# Patient Record
Sex: FEMALE | Race: WHITE | NOT HISPANIC OR LATINO | Employment: FULL TIME | URBAN - METROPOLITAN AREA
[De-identification: names, ages, dates, MRNs, and addresses within clinical notes are randomized per-mention and may not be internally consistent; named-entity substitution may affect disease eponyms.]

---

## 2019-03-04 ENCOUNTER — CONSULT (OUTPATIENT)
Dept: ONCOLOGY | Facility: CLINIC | Age: 44
End: 2019-03-04

## 2019-03-04 VITALS
HEART RATE: 75 BPM | TEMPERATURE: 97.5 F | RESPIRATION RATE: 16 BRPM | OXYGEN SATURATION: 98 % | WEIGHT: 168 LBS | HEIGHT: 66 IN | DIASTOLIC BLOOD PRESSURE: 77 MMHG | SYSTOLIC BLOOD PRESSURE: 122 MMHG | BODY MASS INDEX: 27 KG/M2

## 2019-03-04 DIAGNOSIS — I82.402 DEEP VEIN THROMBOSIS (DVT) OF LEFT LOWER EXTREMITY, UNSPECIFIED CHRONICITY, UNSPECIFIED VEIN (HCC): Primary | ICD-10-CM

## 2019-03-04 PROBLEM — I82.409 DVT (DEEP VENOUS THROMBOSIS) (HCC): Status: ACTIVE | Noted: 2019-03-04

## 2019-03-04 PROCEDURE — 99204 OFFICE O/P NEW MOD 45 MIN: CPT | Performed by: INTERNAL MEDICINE

## 2019-03-04 RX ORDER — MULTIVIT-MIN/IRON/FOLIC ACID/K 18-600-40
2000 CAPSULE ORAL DAILY
COMMUNITY

## 2019-03-04 NOTE — PROGRESS NOTES
ID: 43 y.o. year old female from [facility] [id number]    PCP: Anthony Rede MD    REFERRING PHYSICIAN: No ref. provider found    Reason for Consultation: History of a left lower extremity DVT    Dear Dr. Reed    It is a pleasure to see Ms. Lira again.  It has been 3 years since I last saw her.  She presents today for consultation due to her history of left lower extremity DVT and the need to discontinue anticoagulation.  She initially presented with unprovoked DVT of her left lower extremity.  At that time she was morbidly obese and I felt that the reason for her blood clot was her morbid obesity.  Which can cause a hyper estrogen state.  Since then she is undergone a gastric sleeve which has resulted in 160 pound weight loss.  At the time due to her insurance refusing to pay for anticoagulation she had an IVC filter placed prior to the procedure.  Unfortunately it was unable to be removed postoperatively.  She continues to tolerate her Eliquis reasonably well.  She has no sequela of her DVT.    Past Medical History:   Diagnosis Date   • DVT (deep venous thrombosis) (CMS/HCC)    • Gallstones    • Kidney stones        Past Surgical History:   Procedure Laterality Date   •  SECTION  2005   • CHOLECYSTECTOMY  2003   • GASTRIC SLEEVE LAPAROSCOPIC  2017   • KNEE ARTHROSCOPY Left 2010   • OTHER SURGICAL HISTORY  2017    IVC Filter (unable to remove) Attempted removal 2018   • PAROTIDECTOMY Right 2017   • TONSILLECTOMY  2014       Social History     Socioeconomic History   • Marital status:      Spouse name: Not on file   • Number of children: Not on file   • Years of education: Not on file   • Highest education level: Not on file   Tobacco Use   • Smoking status: Never Smoker   • Smokeless tobacco: Never Used   Substance and Sexual Activity   • Alcohol use: No     Frequency: Never   • Drug use: No   • Sexual activity: Defer       Family History   Problem Relation  Age of Onset   • Breast cancer Mother    • Heart disease Father    • Kidney cancer Father    • Breast cancer Paternal Aunt    • Throat cancer Paternal Grandfather    • Prostate cancer Paternal Grandfather        Review of Systems:    16 point review of systems was performed and reviewed and scanned into the EMR    Review of Systems - Oncology      Current Outpatient Medications:   •  Calcium Carbonate-Vit D-Min (CALCIUM 1200 PO), Take 1 tablet by mouth 2 (Two) Times a Day., Disp: , Rfl:   •  Cholecalciferol (VITAMIN D) 2000 units capsule, Take 2,000 Units by mouth Daily., Disp: , Rfl:   •  Multiple Vitamins-Minerals (MULTIVITAL PO), Take 1 tablet by mouth Daily., Disp: , Rfl:   •  XARELTO 20 MG tablet, TAKE ONE TABLET BY MOUTH DAILY, Disp: 30 tablet, Rfl: 11    Allergies   Allergen Reactions   • Sulfa Antibiotics Rash       ECOG SCORE: 0    Objective     Vitals:    03/04/19 0923   BP: 122/77   Pulse: 75   Resp: 16   Temp: 97.5 °F (36.4 °C)   SpO2: 98%     Wt Readings from Last 3 Encounters:   03/04/19 76.2 kg (168 lb)       Physical Exam    General: well appearing, in no acute distress  HEENT: sclera anicteric, oropharynx clear, neck is supple  Lymphatics: no cervical, supraclavicular, or axillary adenopathy  Cardiovascular: regular rate and rhythm, no murmurs, rubs or gallops  Lungs: clear to auscultation bilaterally  Abdomen: soft, nontender, nondistended.  No palpable organomegaly  Extremities: no lower extremity edema  Skin: no rashes, lesions, bruising, or petechiae  Msk:  Shows no weakness of the large muscle groups  Psych: Mood is stable        ASSESSMENT:    1.  History of DVT secondary to morbid obesity.  I spoke to her regarding her risk of discontinuing anticoagulation.  I think she can do it with minimal risk.  She is lost considerable weight and she is no longer morbidly obese.  However having an IVC filter in place creates thrombogenic focus.  I do recommend an aspirin a day to prevent further clotting  issues.  Also have recommended that she wear compression stockings during the day.  She is willing to do this.  I think this will at least mitigate the risk of blood clots.  She can continue her current bottle of Eliquis till it is emptied and then can discontinue it.  I will see her on an as-needed basis.    I spent a total of 45 minutes in direct patient care, greater than 30 minutes (greater than 50%) were spent in coordination of care, and counseling the patient regarding discussion for the need to stop anticoagulation in the setting of a DVT. Answered any questions patient had with medication and plan.        Thank you for allowing me to participate in the care of this patient.    Yours sincerely,    Marcin Fabian MD  [facility]  Hematology and Oncology         No orders of the defined types were placed in this encounter.        Please note that portions of this note may have been completed with a voice recognition program. Efforts were made to edit the dictations, but occasionally words are mistranscribed.

## 2020-01-13 ENCOUNTER — OFFICE VISIT (OUTPATIENT)
Dept: ORTHOPEDIC SURGERY | Facility: CLINIC | Age: 45
End: 2020-01-13

## 2020-01-13 VITALS — HEART RATE: 79 BPM | HEIGHT: 66 IN | BODY MASS INDEX: 25.55 KG/M2 | WEIGHT: 158.95 LBS | OXYGEN SATURATION: 99 %

## 2020-01-13 DIAGNOSIS — M19.072 OSTEOARTHRITIS OF LEFT ANKLE OR FOOT: ICD-10-CM

## 2020-01-13 DIAGNOSIS — M21.622 TAILOR'S BUNION OF LEFT FOOT: ICD-10-CM

## 2020-01-13 DIAGNOSIS — M79.672 LEFT FOOT PAIN: Primary | ICD-10-CM

## 2020-01-13 PROCEDURE — 99204 OFFICE O/P NEW MOD 45 MIN: CPT | Performed by: ORTHOPAEDIC SURGERY

## 2020-01-13 NOTE — PROGRESS NOTES
NEW PATIENT    Patient: Emy Lira  : 1975    Primary Care Provider: Anthony Reed MD    Requesting Provider: As above    Pain of the Left Foot      History    Chief Complaint: Left foot pain    History of Present Illness: This is a very pleasant 44-year-old woman who is here today with her mother.  She has a complicated history with respect to her left foot.  She had pain in the tarsometatarsal joints as well as over the fifth metatarsal head for many years.  She has tried custom orthotics, she has tried topical medication she has tried different shoe sizes.  She had more pain on uneven ground in the midfoot, better with rest.  She has a history of DVT x2 and has an IVC filter and that could not be removed.  She was on Xarelto for quite a few years.  However she had a gastric sleeve and has lost 260 pounds.  With that, Dr. Fabian has allowed her to be off Xarelto.  He did not find any other cause than obesity for the DVTs.  Also now that she has lost that much weight, she has far less pain in the tarsometatarsal joints.  She still has severe pain over the fifth metatarsal head.  She rates it as 3-6 out of 10, sharp and aching and throbbing.  She is interested in surgery because she cannot wear any shoe except the ones she has on today which is a soft cloth athletic shoe.    Current Outpatient Medications on File Prior to Visit   Medication Sig Dispense Refill   • Calcium Carbonate-Vit D-Min (CALCIUM 1200 PO) Take 1 tablet by mouth 2 (Two) Times a Day.     • Cholecalciferol (VITAMIN D) 2000 units capsule Take 2,000 Units by mouth Daily.     • Multiple Vitamins-Minerals (MULTIVITAL PO) Take 1 tablet by mouth Daily.     • [DISCONTINUED] XARELTO 20 MG tablet TAKE ONE TABLET BY MOUTH DAILY 30 tablet 11     No current facility-administered medications on file prior to visit.       Allergies   Allergen Reactions   • Sulfa Antibiotics Rash      Past Medical History:   Diagnosis Date   • DVT (deep  "venous thrombosis) (CMS/HCC)    • Gallstones    • Kidney stones      Past Surgical History:   Procedure Laterality Date   •  SECTION  2005   • CHOLECYSTECTOMY  2003   • GASTRIC SLEEVE LAPAROSCOPIC  2017   • KNEE ARTHROSCOPY Left 2010   • OTHER SURGICAL HISTORY  2017    IVC Filter (unable to remove) Attempted removal 2018   • PAROTIDECTOMY Right 2017   • TONSILLECTOMY  2014     Family History   Problem Relation Age of Onset   • Breast cancer Mother    • Heart disease Father    • Kidney cancer Father    • Breast cancer Paternal Aunt    • Throat cancer Paternal Grandfather    • Prostate cancer Paternal Grandfather       Social History     Socioeconomic History   • Marital status:      Spouse name: Not on file   • Number of children: Not on file   • Years of education: Not on file   • Highest education level: Not on file   Tobacco Use   • Smoking status: Never Smoker   • Smokeless tobacco: Never Used   Substance and Sexual Activity   • Alcohol use: No     Frequency: Never   • Drug use: No   • Sexual activity: Defer        Review of Systems   Constitutional: Negative.    HENT: Negative.    Eyes: Negative.    Respiratory: Negative.    Cardiovascular: Negative.    Gastrointestinal: Negative.    Endocrine: Negative.    Genitourinary: Negative.    Musculoskeletal: Positive for arthralgias and joint swelling.   Skin: Negative.    Allergic/Immunologic: Negative.    Neurological: Negative.    Hematological: Negative.    Psychiatric/Behavioral: Negative.        The following portions of the patient's history were reviewed and updated as appropriate: allergies, current medications, past family history, past medical history, past social history, past surgical history and problem list.    Physical Exam:   Pulse 79   Ht 166.4 cm (65.51\")   Wt 72.1 kg (158 lb 15.2 oz)   SpO2 99%   Breastfeeding No   BMI 26.04 kg/m²   GENERAL: Body habitus: Mildly overweight    Lower extremity " edema: Right: none; Left: none    Varicose veins:  Right: none; Left: none    Gait: normal     Mental Status:  awake and alert; oriented to person, place, and time    Voice:  clear  SKIN:  Lower extremity: Normal and warm and dry    Hair Growth(lower extremity):  Right:normal; Left:  normal  NAILS: Toenails: normal  HEENT: Head: Normocephalic, atraumatic,  without obvious abnormality.  eye: normal external eye, no icterus  ears:normal external ears  nose: normal external nose  pharynx: dental hygiene adequate  PULM:  Repiratory effort normal  CV:  Dorsalis Pedis:  Right: 2+; Left:2+    Posterior Tibial: Right:2+; Left:2+    Capillary Refill:  Brisk  MSK:  Hand:right handed      Tibia:  Right:  non tender; Left:  non tender      Ankle:  Right: non tender, ROM  normal and symmetric and motor function  normal; Left:  non tender, ROM  normal and symmetric and motor function  normal      Foot:  Right:  Milder metatarsus adductus, small dorsal osteophytes over the TMT's, nontender; Left:  A little more prominent metatarsus adductus, moderate osteophytes over the TMT's but they are nontender.  She is very tender over the lateral aspect of the fifth metatarsal head.      NEURO: Heel Walking:  Right:  normal; Left:  normal    Toe Walking:  Right:  normal; Left:  Painful fifth metatarsal head laterally     [place]-Tomy 5.07 monofilament test: normal    Lower extremity sensation: intact     Reflexes:  Biceps:  Right:  2+; Left:  2+           Quads:  Right:  2+; Left:  2+           Ankle:  Right:  1+; Left:  1+      Calf Atrophy:none    Motor Function: all 5/5         Medical Decision Making    Data Review:   ordered and reviewed x-rays today and reviewed outside records    Assessment and Plan/ Diagnosis/Treatment options:   1. Tailor's bunion of left foot  Painful bunionette or tailor's bunion that has been present for quite a few years.  She has tried extensive conservative treatment.  She would like to consider surgery.   I explained I would do a chevron osteotomy. I explained this in detail.  There will be a pin in the toe for about 4 weeks, I explained how I remove it in the office.  They will walk in  a post op shoe for about 6 weeks, then wide sandal 6 weeks.  I explained how all foot and ankle surgery swells longer than any other surgery- it can swell for many months.  I compared it to facial surgery.  I explained there will be some stiffness in the 5th toe, it is generally not clinically significant. We will do this as an outpatient.     I explained the risks including but not limited to death, infection, neurovascular damage, strokes, heart attacks, blood clots, malunion, nonunion, hardware failure, stiffness, chronic pain,  deformity, toe deformity, amputation, etc.  Questions were asked and answered in detail.  I also spoke with Dr. Fabian, we can use aspirin postoperatively as a DVT prophylaxis since she will be allowed to weight-bear.  - Basic Metabolic Panel; Future  - CBC & Differential; Future  - ECG 12 Lead; Future  - Hemoglobin A1c; Future    2. Osteoarthritis of left ankle or foot  Interestingly, with her weight loss she has much less midfoot pain.  This is consistent with the paper presented at our summer orthopedic foot and ankle meeting showing exactly that.  Midfoot arthritis pain was significantly lessened with weight loss.        Radiology Ordered []  Radiology Reports Reviewed []      Radiology Images Reviewed []   Labs Reviewed []    Labs Ordered []   PCP Records Reviewed []    Provider Records Reviewed []    ER Records Reviewed []    Hospital Records Reviewed []    History Obtained From Family []    Phone conversation with Provider []    Records Requested []            Answers for HPI/ROS submitted by the patient on 1/11/2020   How long have you been having these symptoms?: Other

## 2020-03-17 ENCOUNTER — TELEPHONE (OUTPATIENT)
Dept: ORTHOPEDIC SURGERY | Facility: CLINIC | Age: 45
End: 2020-03-17

## 2020-03-17 NOTE — TELEPHONE ENCOUNTER
CALLED PATIENT TO ADVISE THAT SURGERY ON 4/9/20 WITH DR. FLOYD HAS BEEN CXL'D, NO ANSWER, LVM TO RETURN MY CALL.

## 2020-03-26 ENCOUNTER — APPOINTMENT (OUTPATIENT)
Dept: PREADMISSION TESTING | Facility: HOSPITAL | Age: 45
End: 2020-03-26

## 2020-07-20 ENCOUNTER — APPOINTMENT (OUTPATIENT)
Dept: PREADMISSION TESTING | Facility: HOSPITAL | Age: 45
End: 2020-07-20

## 2020-08-17 ENCOUNTER — APPOINTMENT (OUTPATIENT)
Dept: PREADMISSION TESTING | Facility: HOSPITAL | Age: 45
End: 2020-08-17

## 2020-10-26 ENCOUNTER — APPOINTMENT (OUTPATIENT)
Dept: PREADMISSION TESTING | Facility: HOSPITAL | Age: 45
End: 2020-10-26

## 2020-10-26 LAB
ANION GAP SERPL CALCULATED.3IONS-SCNC: 9 MMOL/L (ref 5–15)
BUN SERPL-MCNC: 18 MG/DL (ref 6–20)
BUN/CREAT SERPL: 30.5 (ref 7–25)
CALCIUM SPEC-SCNC: 9.8 MG/DL (ref 8.6–10.5)
CHLORIDE SERPL-SCNC: 106 MMOL/L (ref 98–107)
CO2 SERPL-SCNC: 26 MMOL/L (ref 22–29)
CREAT SERPL-MCNC: 0.59 MG/DL (ref 0.57–1)
DEPRECATED RDW RBC AUTO: 45.1 FL (ref 37–54)
ERYTHROCYTE [DISTWIDTH] IN BLOOD BY AUTOMATED COUNT: 12.7 % (ref 12.3–15.4)
GFR SERPL CREATININE-BSD FRML MDRD: 111 ML/MIN/1.73
GLUCOSE SERPL-MCNC: 82 MG/DL (ref 65–99)
HBA1C MFR BLD: 4.7 % (ref 4.8–5.6)
HCT VFR BLD AUTO: 41.2 % (ref 34–46.6)
HGB BLD-MCNC: 13.5 G/DL (ref 12–15.9)
MCH RBC QN AUTO: 32.5 PG (ref 26.6–33)
MCHC RBC AUTO-ENTMCNC: 32.8 G/DL (ref 31.5–35.7)
MCV RBC AUTO: 99 FL (ref 79–97)
PLATELET # BLD AUTO: 272 10*3/MM3 (ref 140–450)
PMV BLD AUTO: 9.8 FL (ref 6–12)
POTASSIUM SERPL-SCNC: 4.5 MMOL/L (ref 3.5–5.2)
RBC # BLD AUTO: 4.16 10*6/MM3 (ref 3.77–5.28)
SODIUM SERPL-SCNC: 141 MMOL/L (ref 136–145)
WBC # BLD AUTO: 6.43 10*3/MM3 (ref 3.4–10.8)

## 2020-10-26 PROCEDURE — 80048 BASIC METABOLIC PNL TOTAL CA: CPT | Performed by: ORTHOPAEDIC SURGERY

## 2020-10-26 PROCEDURE — 36415 COLL VENOUS BLD VENIPUNCTURE: CPT

## 2020-10-26 PROCEDURE — 93005 ELECTROCARDIOGRAM TRACING: CPT

## 2020-10-26 PROCEDURE — C9803 HOPD COVID-19 SPEC COLLECT: HCPCS

## 2020-10-26 PROCEDURE — U0004 COV-19 TEST NON-CDC HGH THRU: HCPCS

## 2020-10-26 PROCEDURE — 93010 ELECTROCARDIOGRAM REPORT: CPT | Performed by: INTERNAL MEDICINE

## 2020-10-26 PROCEDURE — 85027 COMPLETE CBC AUTOMATED: CPT | Performed by: ORTHOPAEDIC SURGERY

## 2020-10-26 PROCEDURE — 83036 HEMOGLOBIN GLYCOSYLATED A1C: CPT | Performed by: ORTHOPAEDIC SURGERY

## 2020-10-27 LAB — SARS-COV-2 RNA RESP QL NAA+PROBE: NOT DETECTED

## 2020-10-28 ENCOUNTER — TELEPHONE (OUTPATIENT)
Dept: ORTHOPEDIC SURGERY | Facility: CLINIC | Age: 45
End: 2020-10-28

## 2020-10-28 DIAGNOSIS — M21.622 TAILOR'S BUNION OF LEFT FOOT: Primary | ICD-10-CM

## 2020-10-28 RX ORDER — HYDROCODONE BITARTRATE AND ACETAMINOPHEN 7.5; 325 MG/1; MG/1
1-2 TABLET ORAL EVERY 6 HOURS PRN
Qty: 45 TABLET | Refills: 0 | Status: SHIPPED | OUTPATIENT
Start: 2020-10-28 | End: 2020-11-11

## 2020-10-28 RX ORDER — ONDANSETRON 4 MG/1
4 TABLET, FILM COATED ORAL EVERY 6 HOURS PRN
Qty: 30 TABLET | Refills: 0 | Status: SHIPPED | OUTPATIENT
Start: 2020-10-28 | End: 2020-11-11

## 2020-10-28 RX ORDER — OXYCODONE HYDROCHLORIDE AND ACETAMINOPHEN 5; 325 MG/1; MG/1
1-2 TABLET ORAL EVERY 6 HOURS PRN
Qty: 45 TABLET | Refills: 0 | Status: SHIPPED | OUTPATIENT
Start: 2020-10-28 | End: 2020-11-11

## 2020-10-28 NOTE — TELEPHONE ENCOUNTER
CALLED PATIENT TO ADVISE OF 12:15 ARRIVAL TIME FOR SURGERY ON 1/29/20 WITH DR. FLOYD, NO ANSWER.  LVM WITH DETAILS AND REQUEST TO RETURN MY CALL CONFIRMING RECEIPT OF MESSAGE.

## 2020-10-29 ENCOUNTER — OUTSIDE FACILITY SERVICE (OUTPATIENT)
Dept: ORTHOPEDIC SURGERY | Facility: CLINIC | Age: 45
End: 2020-10-29

## 2020-10-29 PROCEDURE — 28308 INCISION OF METATARSAL: CPT | Performed by: PHYSICIAN ASSISTANT

## 2020-10-29 PROCEDURE — 28308 INCISION OF METATARSAL: CPT | Performed by: ORTHOPAEDIC SURGERY

## 2020-11-06 ENCOUNTER — OFFICE VISIT (OUTPATIENT)
Dept: ORTHOPEDIC SURGERY | Facility: CLINIC | Age: 45
End: 2020-11-06

## 2020-11-06 DIAGNOSIS — Z09 SURGERY FOLLOW-UP: Primary | ICD-10-CM

## 2020-11-06 PROCEDURE — 99024 POSTOP FOLLOW-UP VISIT: CPT | Performed by: ORTHOPAEDIC SURGERY

## 2020-11-06 NOTE — PROGRESS NOTES
Post-op (1 week status post Left fitth chevron osteotomy 10/29/20)      Emy Lira is 1 week status post fifth metatarsal chevron left, 10/29/2020. She reports no fever, chills.  She reports pain is well controlled.  They have been taking aspirin for DVT prophylaxis.  They have been weight bearing on heel in post op shoe.      Past Surgical History:   Procedure Laterality Date   •  SECTION  2005   • CHOLECYSTECTOMY  2003   • GASTRIC SLEEVE LAPAROSCOPIC  2017   • KNEE ARTHROSCOPY Left 2010   • OTHER SURGICAL HISTORY  2017    IVC Filter (unable to remove) Attempted removal 2018   • PAROTIDECTOMY Right 2017   • TONSILLECTOMY  2014       There were no vitals taken for this visit.        Good alignment, no erythema, no drainage, no sign of infection, normal post op swelling pin sites clean left foot    ordered and reviewed x-rays today    Assessment and Plan:   1. Surgery follow-up  Doing well I replaced her into a dressing.  Continue weightbearing on her heel.  I will see her in a week to probably remove sutures, no x-ray needed at that time unless she is having a problem  - XR Foot 2 View Left          Lucie Lord MD

## 2020-11-11 ENCOUNTER — OFFICE VISIT (OUTPATIENT)
Dept: ORTHOPEDIC SURGERY | Facility: CLINIC | Age: 45
End: 2020-11-11

## 2020-11-11 DIAGNOSIS — M21.622 TAILOR'S BUNION OF LEFT FOOT: Primary | ICD-10-CM

## 2020-11-11 PROCEDURE — 99024 POSTOP FOLLOW-UP VISIT: CPT | Performed by: ORTHOPAEDIC SURGERY

## 2020-11-11 NOTE — PROGRESS NOTES
Post-op (1 week follow up; 2 week status post Left fifth chevron osteotomy 10/29/20)      Emy Lira is 2 weeks status post left chevron osteotomy, 10/29/2020. She reports no fever, chills.  She reports pain is well controlled.  They have been taking aspirin for DVT prophylaxis.  They have been weight bearing on heel in post op shoe.      Past Surgical History:   Procedure Laterality Date   •  SECTION  2005   • CHOLECYSTECTOMY  2003   • GASTRIC SLEEVE LAPAROSCOPIC  2017   • KNEE ARTHROSCOPY Left 2010   • OTHER SURGICAL HISTORY  2017    IVC Filter (unable to remove) Attempted removal 2018   • PAROTIDECTOMY Right 2017   • TONSILLECTOMY  2014       There were no vitals taken for this visit.        Good alignment, no erythema, no drainage, no sign of infection, normal post op swelling left foot, pin site clean    none    Assessment and Plan:   1. Tailor's bunion of left foot  Doing well we removed sutures, pin site is clean.  I will see her again in a week for an x-ray to probably remove the pin          Lucie Lord MD

## 2020-11-23 ENCOUNTER — OFFICE VISIT (OUTPATIENT)
Dept: ORTHOPEDIC SURGERY | Facility: CLINIC | Age: 45
End: 2020-11-23

## 2020-11-23 DIAGNOSIS — Z09 SURGERY FOLLOW-UP: Primary | ICD-10-CM

## 2020-11-23 PROCEDURE — 99024 POSTOP FOLLOW-UP VISIT: CPT | Performed by: ORTHOPAEDIC SURGERY

## 2020-11-23 NOTE — PROGRESS NOTES
Post-op (1.5 week f/u--3 weeks status post Left fifth chevron osteotomy 10/29/20)  Answers for HPI/ROS submitted by the patient on 2020   What is the primary reason for your visit?: Other  Please describe your symptoms.: Post Op visit #3  Have you had these symptoms before?: No  How long have you been having these symptoms?: Greater than 2 weeks  Please list any medications you are currently taking for this condition.: None      Emy Lira is 3 weeks status post left 5th metatarsal chevron, 10/29/2020. She reports no fever, chills.  She reports pain is well controlled.  They have been taking aspirin for DVT prophylaxis.  They have been weight bearing on heel in post op shoe.      Past Surgical History:   Procedure Laterality Date   •  SECTION  2005   • CHOLECYSTECTOMY  2003   • GASTRIC SLEEVE LAPAROSCOPIC  2017   • KNEE ARTHROSCOPY Left 2010   • OTHER SURGICAL HISTORY  2017    IVC Filter (unable to remove) Attempted removal 2018   • PAROTIDECTOMY Right 2017   • TONSILLECTOMY  2014       There were no vitals taken for this visit.        Good alignment, no erythema, no drainage, no sign of infection, normal post op swelling pin site clean left foot    ordered and reviewed x-rays today    Assessment and Plan:   1. Surgery follow-up  I removed the pin, she may shower but no soaking until all scabs are gone, walk in post op shoe.  See me for xray in 6 weeks  - XR Foot 2 View Left          Lucie Lord MD

## 2021-01-04 ENCOUNTER — OFFICE VISIT (OUTPATIENT)
Dept: ORTHOPEDIC SURGERY | Facility: CLINIC | Age: 46
End: 2021-01-04

## 2021-01-04 DIAGNOSIS — Z09 SURGERY FOLLOW-UP: Primary | ICD-10-CM

## 2021-01-04 PROCEDURE — 99024 POSTOP FOLLOW-UP VISIT: CPT | Performed by: ORTHOPAEDIC SURGERY

## 2021-01-04 NOTE — PROGRESS NOTES
Post-op Follow-up (6 week follow up - 9 weeks status post Left fifth chevron osteotomy 10/29/20)      Emy Lira is 9 weeks status post left 5th chevron, 10/29/2020. She reports no fever, chills.  She reports pain is well controlled.  They have been taking off meds now for DVT prophylaxis.  They have been weight bearing as tolerated.      Past Surgical History:   Procedure Laterality Date   •  SECTION  2005   • CHOLECYSTECTOMY  2003   • GASTRIC SLEEVE LAPAROSCOPIC  2017   • KNEE ARTHROSCOPY Left 2010   • OTHER SURGICAL HISTORY  2017    IVC Filter (unable to remove) Attempted removal 2018   • PAROTIDECTOMY Right 2017   • TONSILLECTOMY  2014       There were no vitals taken for this visit.        Good alignment, no erythema, no drainage, no sign of infection, normal post op swelling left foot    ordered and reviewed x-rays today    Assessment and Plan:   1. Surgery follow-up  Doing well, she may slowly increase activity, no impact, wear any shoe that is comfortable.  I reminded her the swelling is normal.  I will see her in 3 months for final xray  - XR Foot 2 View Left          Lucie Rice MD

## 2021-04-05 ENCOUNTER — OFFICE VISIT (OUTPATIENT)
Dept: ORTHOPEDIC SURGERY | Facility: CLINIC | Age: 46
End: 2021-04-05

## 2021-04-05 VITALS
SYSTOLIC BLOOD PRESSURE: 123 MMHG | BODY MASS INDEX: 28.32 KG/M2 | WEIGHT: 176.2 LBS | DIASTOLIC BLOOD PRESSURE: 91 MMHG | HEART RATE: 82 BPM | HEIGHT: 66 IN

## 2021-04-05 DIAGNOSIS — Z09 SURGERY FOLLOW-UP: Primary | ICD-10-CM

## 2021-04-05 PROCEDURE — 99212 OFFICE O/P EST SF 10 MIN: CPT | Performed by: ORTHOPAEDIC SURGERY

## 2021-04-05 RX ORDER — UBROGEPANT 100 MG/1
TABLET ORAL
COMMUNITY
Start: 2021-02-09 | End: 2022-04-06 | Stop reason: SDUPTHER

## 2021-04-05 NOTE — PROGRESS NOTES
ESTABLISHED PATIENT    Patient: Emy Lira  : 1975    Primary Care Provider: Anthony Reed MD    Requesting Provider: As above    Follow-up (3 month follow up; 5.5 months status post Left fifth chevron osteotomy 10/29/20)      History    Chief Complaint: Status post left foot surgery    History of Present Illness: She is here for follow-up following left fifth metatarsal chevron osteotomy 10/29/2020.  She reports she is very happy with the pain relief.      Current Outpatient Medications on File Prior to Visit   Medication Sig Dispense Refill   • Calcium Carbonate-Vit D-Min (CALCIUM 1200 PO) Take 1 tablet by mouth 2 (Two) Times a Day.     • Cholecalciferol (VITAMIN D) 2000 units capsule Take 2,000 Units by mouth Daily.     • Multiple Vitamins-Minerals (MULTIVITAL PO) Take 1 tablet by mouth Daily.     • ubrogepant 100 MG tablet        No current facility-administered medications on file prior to visit.      Allergies   Allergen Reactions   • Sulfa Antibiotics Rash      Past Medical History:   Diagnosis Date   • DVT (deep venous thrombosis) (CMS/[facility])    • Gallstones    • Kidney stones      Past Surgical History:   Procedure Laterality Date   •  SECTION  2005   • CHOLECYSTECTOMY  2003   • GASTRIC SLEEVE LAPAROSCOPIC  2017   • KNEE ARTHROSCOPY Left 2010   • OTHER SURGICAL HISTORY  2017    IVC Filter (unable to remove) Attempted removal 2018   • PAROTIDECTOMY Right 2017   • TONSILLECTOMY  2014     Family History   Problem Relation Age of Onset   • Breast cancer Mother    • Heart disease Father    • Kidney cancer Father    • Breast cancer Paternal Aunt    • Throat cancer Paternal Grandfather    • Prostate cancer Paternal Grandfather       Social History     Socioeconomic History   • Marital status:      Spouse name: Not on file   • Number of children: Not on file   • Years of education: Not on file   • Highest education level: Not on file   Tobacco Use   •  "Smoking status: Never Smoker   • Smokeless tobacco: Never Used   Substance and Sexual Activity   • Alcohol use: No   • Drug use: No   • Sexual activity: Defer        Review of Systems   Constitutional: Negative.    HENT: Negative.    Eyes: Negative.    Respiratory: Negative.    Cardiovascular: Negative.    Gastrointestinal: Negative.    Endocrine: Negative.    Genitourinary: Negative.    Musculoskeletal: Positive for arthralgias.   Skin: Negative.    Allergic/Immunologic: Negative.    Neurological: Negative.    Hematological: Negative.    Psychiatric/Behavioral: Negative.        The following portions of the patient's history were reviewed and updated as appropriate: allergies, current medications, past family history, past medical history, past social history, past surgical history and problem list.    Physical Exam:   /91   Pulse 82   Ht 166.4 cm (65.51\")   Wt 79.9 kg (176 lb 3.2 oz)   BMI 28.86 kg/m²   GENERAL: Body habitus: overweight    Lower extremity edema: Left: none; Right: none    Gait: normal     Mental Status:  awake and alert; oriented to person, place, and time  MSK:  Left foot lateral fifth metatarsal incision is healed, no swelling, very slightly tender to palpation, no change in midfoot arthritis    Medical Decision Making    Data Review:   ordered and reviewed x-rays today    Assessment/Plan/Diagnosis/Treatment Options:   1. Surgery follow-up  She is radiographically and clinically healed.  She is happy with the pain relief.  The slight tenderness in the incision should slowly resolve over time.  I will be happy to see her anytime.  - XR Foot 2 View Left        Lucie Rice MD                      "

## 2022-04-06 ENCOUNTER — OFFICE VISIT (OUTPATIENT)
Dept: FAMILY MEDICINE CLINIC | Facility: CLINIC | Age: 47
End: 2022-04-06

## 2022-04-06 VITALS
WEIGHT: 195 LBS | HEIGHT: 67 IN | SYSTOLIC BLOOD PRESSURE: 138 MMHG | OXYGEN SATURATION: 98 % | BODY MASS INDEX: 30.61 KG/M2 | HEART RATE: 81 BPM | DIASTOLIC BLOOD PRESSURE: 80 MMHG

## 2022-04-06 DIAGNOSIS — Z00.00 ROUTINE GENERAL MEDICAL EXAMINATION AT A HEALTH CARE FACILITY: ICD-10-CM

## 2022-04-06 DIAGNOSIS — G43.009 MIGRAINE WITHOUT AURA AND WITHOUT STATUS MIGRAINOSUS, NOT INTRACTABLE: Primary | ICD-10-CM

## 2022-04-06 DIAGNOSIS — M25.551 RIGHT HIP PAIN: ICD-10-CM

## 2022-04-06 PROCEDURE — 99214 OFFICE O/P EST MOD 30 MIN: CPT | Performed by: FAMILY MEDICINE

## 2022-04-06 RX ORDER — ATOGEPANT 60 MG/1
60 TABLET ORAL DAILY
Qty: 30 TABLET | Refills: 11 | Status: SHIPPED | OUTPATIENT
Start: 2022-04-06

## 2022-04-06 RX ORDER — UBROGEPANT 100 MG/1
100 TABLET ORAL ONCE AS NEEDED
Qty: 10 TABLET | Refills: 11 | Status: SHIPPED | OUTPATIENT
Start: 2022-04-06

## 2022-04-06 RX ORDER — PREDNISONE 20 MG/1
TABLET ORAL
Qty: 18 TABLET | Refills: 0 | Status: SHIPPED | OUTPATIENT
Start: 2022-04-06

## 2022-04-06 RX ORDER — MEDROXYPROGESTERONE ACETATE 10 MG/1
TABLET ORAL
COMMUNITY
Start: 2021-09-01

## 2022-04-06 NOTE — PROGRESS NOTES
Follow Up Office Visit      Date of Visit:  2022   Patient Name: Emy Lira  : 1975   MRN: [id number]     Chief Complaint:    Chief Complaint   Patient presents with   • Headache     Needs refill of Ubrelvy       History of Present Illness: Emy Lira is a 47 y.o. female who is here today for follow up.    She has tried medications for abortive therapy as well as preventative therapy.  The Ubrelvy is working better than the triptans.  She is interested in trying something else for prevention.  She is also due for checkup blood work today.    Headache  Headache pattern:  Headache sometimes there, sometimes not at all  Recent changes:  No recent change in headache pattern  Frequency:  1 to 3 per month  Providers seen:  Primary care provider  Aggravating factors:  None  Abortive medications tried:  Sumatriptan  Preventative medications tried:  Topiramate        Subjective      Review of Systems:   Review of Systems   Constitutional: Negative for fatigue and fever.   HENT: Negative for congestion and ear pain.    Respiratory: Negative for apnea, cough, chest tightness and shortness of breath.    Cardiovascular: Negative for chest pain.   Gastrointestinal: Negative for abdominal pain, constipation, diarrhea and nausea.   Musculoskeletal: Negative for arthralgias.   Neurological: Positive for headache.   Psychiatric/Behavioral: Negative for depressed mood and stress.       Past Medical History:   Past Medical History:   Diagnosis Date   • DVT (deep venous thrombosis) (HCC)    • Gallstones    • Kidney stones        Past Surgical History:   Past Surgical History:   Procedure Laterality Date   •  SECTION  2005   • CHOLECYSTECTOMY  2003   • GASTRIC SLEEVE LAPAROSCOPIC  2017   • KNEE ARTHROSCOPY Left 2010   • OTHER SURGICAL HISTORY  2017    IVC Filter (unable to remove) Attempted removal 2018   • PAROTIDECTOMY Right 2017   • TONSILLECTOMY  2014       Family  "History:   Family History   Problem Relation Age of Onset   • Breast cancer Mother    • Heart disease Father    • Kidney cancer Father    • Breast cancer Paternal Aunt    • Throat cancer Paternal Grandfather    • Prostate cancer Paternal Grandfather        Social History:   Social History     Socioeconomic History   • Marital status:    Tobacco Use   • Smoking status: Never Smoker   • Smokeless tobacco: Never Used   Vaping Use   • Vaping Use: Never used   Substance and Sexual Activity   • Alcohol use: No   • Drug use: No   • Sexual activity: Defer       Medications:     Current Outpatient Medications:   •  Calcium Carbonate-Vit D-Min (CALCIUM 1200 PO), Take 1 tablet by mouth 2 (Two) Times a Day., Disp: , Rfl:   •  Cholecalciferol (VITAMIN D) 2000 units capsule, Take 2,000 Units by mouth Daily., Disp: , Rfl:   •  medroxyPROGESTERone (PROVERA) 10 MG tablet, , Disp: , Rfl:   •  Multiple Vitamins-Minerals (MULTIVITAL PO), Take 1 tablet by mouth Daily., Disp: , Rfl:   •  ubrogepant 100 MG tablet, Take 1 tablet by mouth 1 (One) Time As Needed (headache) for up to 1 dose., Disp: 10 tablet, Rfl: 11  •  Atogepant (Qulipta) 60 MG tablet, Take 1 tablet by mouth Daily., Disp: 30 tablet, Rfl: 11  •  predniSONE (DELTASONE) 20 MG tablet, 3 po qd x3 d then 2 po qd x3d then 1 po qd x3d, Disp: 18 tablet, Rfl: 0    Allergies:   Allergies   Allergen Reactions   • Sulfa Antibiotics Rash       Objective     Physical Exam:  Vital Signs:   Vitals:    04/06/22 0855   BP: 138/80   Pulse: 81   SpO2: 98%   Weight: 88.5 kg (195 lb)   Height: 170.2 cm (67\")     Body mass index is 30.54 kg/m².     Physical Exam  Vitals and nursing note reviewed.   Constitutional:       General: She is not in acute distress.     Appearance: Normal appearance. She is not ill-appearing.   HENT:      Head: Normocephalic and atraumatic.      Right Ear: Tympanic membrane and ear canal normal.      Left Ear: Tympanic membrane and ear canal normal.      Nose: Nose " normal.   Cardiovascular:      Rate and Rhythm: Normal rate and regular rhythm.      Heart sounds: Normal heart sounds.   Pulmonary:      Effort: Pulmonary effort is normal.      Breath sounds: Normal breath sounds.   Neurological:      Mental Status: She is alert and oriented to person, place, and time. Mental status is at baseline.   Psychiatric:         Mood and Affect: Mood normal.         Procedures    BMI is above normal parameters. Recommendations: exercise counseling/recommendations and nutrition counseling/recommendations        Assessment / Plan      Assessment/Plan:   Diagnoses and all orders for this visit:    1. Migraine without aura and without status migrainosus, not intractable (Primary)    2. Routine general medical examination at a health care facility  -     CBC Auto Differential; Future  -     Comprehensive Metabolic Panel; Future  -     Lipid Panel; Future  -     TSH; Future    3. Right hip pain    Other orders  -     ubrogepant 100 MG tablet; Take 1 tablet by mouth 1 (One) Time As Needed (headache) for up to 1 dose.  Dispense: 10 tablet; Refill: 11  -     Atogepant (Qulipta) 60 MG tablet; Take 1 tablet by mouth Daily.  Dispense: 30 tablet; Refill: 11  -     predniSONE (DELTASONE) 20 MG tablet; 3 po qd x3 d then 2 po qd x3d then 1 po qd x3d  Dispense: 18 tablet; Refill: 0         Refilled Ubrelvy.  Gave samples and prescription for preventative.  Use Qulipta.  She was also having some right hip pain.  Give a course of steroids.  Blood work for preventative exam.    Follow Up:   Return in about 1 year (around 4/6/2023) for Annual physical.    Anthony [facility] [place]   Answers for HPI/ROS submitted by the patient on 4/3/2022  Please describe your symptoms.: Medicine refill for migraines.  Have you had these symptoms before?: Yes  How long have you been having these symptoms?: Greater than 2 weeks  Please list any medications you are currently taking for this condition.:  Ubrevshane (spelling)  Please describe any probable cause for these symptoms. : Not sure  What is the primary reason for your visit?: Other

## 2022-04-07 ENCOUNTER — LAB (OUTPATIENT)
Dept: FAMILY MEDICINE CLINIC | Facility: CLINIC | Age: 47
End: 2022-04-07

## 2022-04-07 DIAGNOSIS — Z00.00 ROUTINE GENERAL MEDICAL EXAMINATION AT A HEALTH CARE FACILITY: ICD-10-CM

## 2022-04-07 PROCEDURE — 36415 COLL VENOUS BLD VENIPUNCTURE: CPT | Performed by: FAMILY MEDICINE

## 2022-04-08 LAB
ALBUMIN SERPL-MCNC: 3.8 G/DL (ref 3.8–4.8)
ALBUMIN/GLOB SERPL: 1.7 {RATIO} (ref 1.2–2.2)
ALP SERPL-CCNC: 112 IU/L (ref 44–121)
ALT SERPL-CCNC: 14 IU/L (ref 0–32)
AST SERPL-CCNC: 16 IU/L (ref 0–40)
BASOPHILS # BLD AUTO: 0 X10E3/UL (ref 0–0.2)
BASOPHILS NFR BLD AUTO: 1 %
BILIRUB SERPL-MCNC: 0.6 MG/DL (ref 0–1.2)
BUN SERPL-MCNC: 11 MG/DL (ref 6–24)
BUN/CREAT SERPL: 15 (ref 9–23)
CALCIUM SERPL-MCNC: 9.2 MG/DL (ref 8.7–10.2)
CHLORIDE SERPL-SCNC: 108 MMOL/L (ref 96–106)
CHOLEST SERPL-MCNC: 172 MG/DL (ref 100–199)
CO2 SERPL-SCNC: 18 MMOL/L (ref 20–29)
CREAT SERPL-MCNC: 0.73 MG/DL (ref 0.57–1)
EGFRCR SERPLBLD CKD-EPI 2021: 103 ML/MIN/1.73
EOSINOPHIL # BLD AUTO: 0.1 X10E3/UL (ref 0–0.4)
EOSINOPHIL NFR BLD AUTO: 2 %
ERYTHROCYTE [DISTWIDTH] IN BLOOD BY AUTOMATED COUNT: 12 % (ref 11.7–15.4)
GLOBULIN SER CALC-MCNC: 2.3 G/DL (ref 1.5–4.5)
GLUCOSE SERPL-MCNC: 91 MG/DL (ref 65–99)
HCT VFR BLD AUTO: 44.2 % (ref 34–46.6)
HDLC SERPL-MCNC: 52 MG/DL
HGB BLD-MCNC: 14.4 G/DL (ref 11.1–15.9)
IMM GRANULOCYTES # BLD AUTO: 0 X10E3/UL (ref 0–0.1)
IMM GRANULOCYTES NFR BLD AUTO: 0 %
LDLC SERPL CALC-MCNC: 108 MG/DL (ref 0–99)
LYMPHOCYTES # BLD AUTO: 1.1 X10E3/UL (ref 0.7–3.1)
LYMPHOCYTES NFR BLD AUTO: 20 %
MCH RBC QN AUTO: 31.2 PG (ref 26.6–33)
MCHC RBC AUTO-ENTMCNC: 32.6 G/DL (ref 31.5–35.7)
MCV RBC AUTO: 96 FL (ref 79–97)
MONOCYTES # BLD AUTO: 0.7 X10E3/UL (ref 0.1–0.9)
MONOCYTES NFR BLD AUTO: 13 %
NEUTROPHILS # BLD AUTO: 3.5 X10E3/UL (ref 1.4–7)
NEUTROPHILS NFR BLD AUTO: 64 %
PLATELET # BLD AUTO: 311 X10E3/UL (ref 150–450)
POTASSIUM SERPL-SCNC: 4.6 MMOL/L (ref 3.5–5.2)
PROT SERPL-MCNC: 6.1 G/DL (ref 6–8.5)
RBC # BLD AUTO: 4.61 X10E6/UL (ref 3.77–5.28)
SODIUM SERPL-SCNC: 142 MMOL/L (ref 134–144)
TRIGL SERPL-MCNC: 59 MG/DL (ref 0–149)
TSH SERPL DL<=0.005 MIU/L-ACNC: 1.25 UIU/ML (ref 0.45–4.5)
VLDLC SERPL CALC-MCNC: 12 MG/DL (ref 5–40)
WBC # BLD AUTO: 5.5 X10E3/UL (ref 3.4–10.8)

## 2022-04-11 ENCOUNTER — OFFICE VISIT (OUTPATIENT)
Dept: FAMILY MEDICINE CLINIC | Facility: CLINIC | Age: 47
End: 2022-04-11

## 2022-04-11 ENCOUNTER — TELEPHONE (OUTPATIENT)
Dept: FAMILY MEDICINE CLINIC | Facility: CLINIC | Age: 47
End: 2022-04-11

## 2022-04-11 VITALS
SYSTOLIC BLOOD PRESSURE: 140 MMHG | HEART RATE: 95 BPM | OXYGEN SATURATION: 98 % | HEIGHT: 67 IN | WEIGHT: 195 LBS | BODY MASS INDEX: 30.61 KG/M2 | DIASTOLIC BLOOD PRESSURE: 86 MMHG | TEMPERATURE: 98.1 F

## 2022-04-11 DIAGNOSIS — B02.9 VARICELLA ZOSTER: Primary | ICD-10-CM

## 2022-04-11 PROCEDURE — 99214 OFFICE O/P EST MOD 30 MIN: CPT | Performed by: FAMILY MEDICINE

## 2022-04-11 RX ORDER — ACYCLOVIR 800 MG/1
800 TABLET ORAL
Qty: 35 TABLET | Refills: 0 | Status: SHIPPED | OUTPATIENT
Start: 2022-04-11

## 2022-04-11 RX ORDER — HYDROXYZINE HYDROCHLORIDE 25 MG/1
25 TABLET, FILM COATED ORAL 3 TIMES DAILY PRN
Qty: 30 TABLET | Refills: 0 | Status: SHIPPED | OUTPATIENT
Start: 2022-04-11

## 2022-04-11 NOTE — PROGRESS NOTES
Follow Up Office Visit      Date of Visit:  2022   Patient Name: Emy Lira  : 1975   MRN: [id number]     Chief Complaint:    Chief Complaint   Patient presents with   • Rash     Patient has a rash all over her body. Was exposed to chicken pox by a friends child about 2 weeks ago.        History of Present Illness: Emy Lira is a 47 y.o. female who is here today for a rash.  Noticed yesterday.  Positive exposure to chickenpox.  Rash is vesicular and in different stages.  Some are scabbed over.  She also systemically has body aches myalgias and chills.  She also has some mild cough and nasal congestion.  She is unsure if she ever had chickenpox as a child.  HPI      Subjective      Review of Systems:   Review of Systems   HENT: Positive for postnasal drip and sinus pressure.    Skin: Positive for rash.       Past Medical History:   Past Medical History:   Diagnosis Date   • DVT (deep venous thrombosis) (HCC)    • Gallstones    • Kidney stones        Past Surgical History:   Past Surgical History:   Procedure Laterality Date   •  SECTION  2005   • CHOLECYSTECTOMY  2003   • GASTRIC SLEEVE LAPAROSCOPIC  2017   • KNEE ARTHROSCOPY Left 2010   • OTHER SURGICAL HISTORY  2017    IVC Filter (unable to remove) Attempted removal 2018   • PAROTIDECTOMY Right 2017   • TONSILLECTOMY  2014       Family History:   Family History   Problem Relation Age of Onset   • Breast cancer Mother    • Heart disease Father    • Kidney cancer Father    • Breast cancer Paternal Aunt    • Throat cancer Paternal Grandfather    • Prostate cancer Paternal Grandfather        Social History:   Social History     Socioeconomic History   • Marital status:    Tobacco Use   • Smoking status: Never Smoker   • Smokeless tobacco: Never Used   Vaping Use   • Vaping Use: Never used   Substance and Sexual Activity   • Alcohol use: No   • Drug use: No   • Sexual activity: Defer  "      Medications:     Current Outpatient Medications:   •  Atogepant (Qulipta) 60 MG tablet, Take 1 tablet by mouth Daily., Disp: 30 tablet, Rfl: 11  •  Cholecalciferol (VITAMIN D) 2000 units capsule, Take 2,000 Units by mouth Daily., Disp: , Rfl:   •  medroxyPROGESTERone (PROVERA) 10 MG tablet, , Disp: , Rfl:   •  Multiple Vitamins-Minerals (MULTIVITAL PO), Take 1 tablet by mouth Daily., Disp: , Rfl:   •  predniSONE (DELTASONE) 20 MG tablet, 3 po qd x3 d then 2 po qd x3d then 1 po qd x3d, Disp: 18 tablet, Rfl: 0  •  ubrogepant 100 MG tablet, Take 1 tablet by mouth 1 (One) Time As Needed (headache) for up to 1 dose., Disp: 10 tablet, Rfl: 11  •  acyclovir (ZOVIRAX) 800 MG tablet, Take 1 tablet by mouth 5 (Five) Times a Day., Disp: 35 tablet, Rfl: 0  •  Calcium Carbonate-Vit D-Min (CALCIUM 1200 PO), Take 1 tablet by mouth 2 (Two) Times a Day., Disp: , Rfl:   •  hydrOXYzine (ATARAX) 25 MG tablet, Take 1 tablet by mouth 3 (Three) Times a Day As Needed for Itching., Disp: 30 tablet, Rfl: 0    Allergies:   Allergies   Allergen Reactions   • Sulfa Antibiotics Rash       Objective     Physical Exam:  Vital Signs:   Vitals:    04/11/22 1545   BP: 140/86   BP Location: Left arm   Patient Position: Sitting   Cuff Size: Adult   Pulse: 95   Temp: 98.1 °F (36.7 °C)   SpO2: 98%   Weight: 88.5 kg (195 lb)   Height: 170.2 cm (67\")     Body mass index is 30.54 kg/m².     Physical Exam  Vitals and nursing note reviewed.   Constitutional:       General: She is not in acute distress.     Appearance: Normal appearance. She is not ill-appearing.   HENT:      Head: Normocephalic and atraumatic.      Right Ear: Tympanic membrane and ear canal normal.      Left Ear: Tympanic membrane and ear canal normal.      Nose: Nose normal.   Cardiovascular:      Rate and Rhythm: Normal rate and regular rhythm.      Heart sounds: Normal heart sounds.   Pulmonary:      Effort: Pulmonary effort is normal.      Breath sounds: Normal breath sounds. "   Skin:     Comments: Vesicular rash.  Some scabbed over   Neurological:      Mental Status: She is alert and oriented to person, place, and time. Mental status is at baseline.   Psychiatric:         Mood and Affect: Mood normal.         Procedures    BMI is above normal parameters. Recommendations: exercise counseling/recommendations and nutrition counseling/recommendations        Assessment / Plan      Assessment/Plan:   Diagnoses and all orders for this visit:    1. Varicella zoster (Primary)  -     Cancel: Varicella Zoster PCR; Future  -     Varicella Zoster PCR; Future  -     Varicella Zoster PCR    Other orders  -     acyclovir (ZOVIRAX) 800 MG tablet; Take 1 tablet by mouth 5 (Five) Times a Day.  Dispense: 35 tablet; Refill: 0  -     hydrOXYzine (ATARAX) 25 MG tablet; Take 1 tablet by mouth 3 (Three) Times a Day As Needed for Itching.  Dispense: 30 tablet; Refill: 0         It does appear she actually does have varicella.  Varicella PCR to be done.  Did give acyclovir and hydroxyzine.  She will call and give us an update on how she feels later.  This can be a much more serious illness in adults sometimes leading to pneumonia.    Follow Up:   Return if symptoms worsen or fail to improve.    Anthony Reed  [facility] [place]

## 2022-04-17 LAB — VZV DNA SPEC QL NAA+PROBE: POSITIVE

## 2022-04-27 ENCOUNTER — PATIENT MESSAGE (OUTPATIENT)
Dept: FAMILY MEDICINE CLINIC | Facility: CLINIC | Age: 47
End: 2022-04-27

## 2022-05-16 NOTE — TELEPHONE ENCOUNTER
Insurance needed more information on the PA that was sent in on covermymeds. Faxed over the information and will be looking for a response.

## 2022-08-09 ENCOUNTER — PATIENT MESSAGE (OUTPATIENT)
Dept: FAMILY MEDICINE CLINIC | Facility: CLINIC | Age: 47
End: 2022-08-09

## 2022-08-11 ENCOUNTER — TELEPHONE (OUTPATIENT)
Dept: FAMILY MEDICINE CLINIC | Facility: CLINIC | Age: 47
End: 2022-08-11

## 2022-08-11 NOTE — TELEPHONE ENCOUNTER
Looks like Miley has been contacting patient through my chart. She came and picked up some ubrelvy samples

## 2022-08-11 NOTE — TELEPHONE ENCOUNTER
Caller: Emy Lira    Relationship: Self    Best call back number: [phone]    What is the best time to reach you: ANYTIME    Who are you requesting to speak with (clinical staff, provider,  specific staff member): CLINICAL STAFF    Do you know the name of the person who called: SELF    What was the call regarding: PATIENT STATES THAT SHE WOULD LIKE A CALL ABOUT THE HEADACHES THAT SHE HAS HAD FOR FOUR DAYS.     Do you require a callback: YES

## 2023-04-13 ENCOUNTER — OFFICE VISIT (OUTPATIENT)
Dept: FAMILY MEDICINE CLINIC | Facility: CLINIC | Age: 48
End: 2023-04-13
Payer: MEDICAID

## 2023-04-13 VITALS
HEART RATE: 77 BPM | SYSTOLIC BLOOD PRESSURE: 128 MMHG | BODY MASS INDEX: 32.11 KG/M2 | OXYGEN SATURATION: 96 % | WEIGHT: 204.6 LBS | DIASTOLIC BLOOD PRESSURE: 84 MMHG | HEIGHT: 67 IN

## 2023-04-13 DIAGNOSIS — M54.31 SCIATICA OF RIGHT SIDE: ICD-10-CM

## 2023-04-13 DIAGNOSIS — Z00.00 ROUTINE GENERAL MEDICAL EXAMINATION AT A HEALTH CARE FACILITY: Primary | ICD-10-CM

## 2023-04-13 DIAGNOSIS — G43.109 MIGRAINE WITH AURA AND WITHOUT STATUS MIGRAINOSUS, NOT INTRACTABLE: ICD-10-CM

## 2023-04-13 DIAGNOSIS — E55.9 VITAMIN D DEFICIENCY: ICD-10-CM

## 2023-04-13 PROCEDURE — 36415 COLL VENOUS BLD VENIPUNCTURE: CPT | Performed by: FAMILY MEDICINE

## 2023-04-13 RX ORDER — UBROGEPANT 100 MG/1
100 TABLET ORAL ONCE AS NEEDED
Qty: 10 TABLET | Refills: 11 | Status: SHIPPED | OUTPATIENT
Start: 2023-04-13

## 2023-04-14 ENCOUNTER — PATIENT MESSAGE (OUTPATIENT)
Dept: FAMILY MEDICINE CLINIC | Facility: CLINIC | Age: 48
End: 2023-04-14
Payer: MEDICAID

## 2023-04-14 LAB
25(OH)D3+25(OH)D2 SERPL-MCNC: 49.8 NG/ML (ref 30–100)
ALBUMIN SERPL-MCNC: 4.2 G/DL (ref 3.8–4.8)
ALBUMIN/GLOB SERPL: 2 {RATIO} (ref 1.2–2.2)
ALP SERPL-CCNC: 124 IU/L (ref 44–121)
ALT SERPL-CCNC: 14 IU/L (ref 0–32)
AST SERPL-CCNC: 15 IU/L (ref 0–40)
BASOPHILS # BLD AUTO: 0 X10E3/UL (ref 0–0.2)
BASOPHILS NFR BLD AUTO: 1 %
BILIRUB SERPL-MCNC: 0.6 MG/DL (ref 0–1.2)
BUN SERPL-MCNC: 13 MG/DL (ref 6–24)
BUN/CREAT SERPL: 23 (ref 9–23)
CALCIUM SERPL-MCNC: 9.3 MG/DL (ref 8.7–10.2)
CHLORIDE SERPL-SCNC: 106 MMOL/L (ref 96–106)
CHOLEST SERPL-MCNC: 173 MG/DL (ref 100–199)
CO2 SERPL-SCNC: 23 MMOL/L (ref 20–29)
CREAT SERPL-MCNC: 0.57 MG/DL (ref 0.57–1)
EGFRCR SERPLBLD CKD-EPI 2021: 113 ML/MIN/1.73
EOSINOPHIL # BLD AUTO: 0.1 X10E3/UL (ref 0–0.4)
EOSINOPHIL NFR BLD AUTO: 2 %
ERYTHROCYTE [DISTWIDTH] IN BLOOD BY AUTOMATED COUNT: 12 % (ref 11.7–15.4)
GLOBULIN SER CALC-MCNC: 2.1 G/DL (ref 1.5–4.5)
GLUCOSE SERPL-MCNC: 91 MG/DL (ref 70–99)
HCT VFR BLD AUTO: 41.3 % (ref 34–46.6)
HDLC SERPL-MCNC: 60 MG/DL
HGB BLD-MCNC: 14.5 G/DL (ref 11.1–15.9)
IMM GRANULOCYTES # BLD AUTO: 0 X10E3/UL (ref 0–0.1)
IMM GRANULOCYTES NFR BLD AUTO: 0 %
LDLC SERPL CALC-MCNC: 100 MG/DL (ref 0–99)
LYMPHOCYTES # BLD AUTO: 1.7 X10E3/UL (ref 0.7–3.1)
LYMPHOCYTES NFR BLD AUTO: 29 %
MCH RBC QN AUTO: 32.7 PG (ref 26.6–33)
MCHC RBC AUTO-ENTMCNC: 35.1 G/DL (ref 31.5–35.7)
MCV RBC AUTO: 93 FL (ref 79–97)
MONOCYTES # BLD AUTO: 0.6 X10E3/UL (ref 0.1–0.9)
MONOCYTES NFR BLD AUTO: 11 %
NEUTROPHILS # BLD AUTO: 3.3 X10E3/UL (ref 1.4–7)
NEUTROPHILS NFR BLD AUTO: 57 %
PLATELET # BLD AUTO: 288 X10E3/UL (ref 150–450)
POTASSIUM SERPL-SCNC: 5 MMOL/L (ref 3.5–5.2)
PROT SERPL-MCNC: 6.3 G/DL (ref 6–8.5)
RBC # BLD AUTO: 4.43 X10E6/UL (ref 3.77–5.28)
SODIUM SERPL-SCNC: 139 MMOL/L (ref 134–144)
TRIGL SERPL-MCNC: 66 MG/DL (ref 0–149)
TSH SERPL DL<=0.005 MIU/L-ACNC: 1.49 UIU/ML (ref 0.45–4.5)
VLDLC SERPL CALC-MCNC: 13 MG/DL (ref 5–40)
WBC # BLD AUTO: 5.7 X10E3/UL (ref 3.4–10.8)

## 2023-04-14 NOTE — PROGRESS NOTES
Male Physical Note      Date:  2023   Patient Name: Emy Lira  : 1975   MRN: [id number]     Chief Complaint:    Chief Complaint   Patient presents with   • Annual Exam     PHYSICAL        History of Present Illness: Emy Lira is a 48 y.o. female who is here today for their annual health maintenance and physical.  Appropriate health maintenance discussed with patient.  Appropriate cancer screenings and vaccines discussed.  Discussed appropriate diet.  She does need refills on medication takes for her migraines.  Condition overall stable.  Patient continues to have sciatica on the right side.  She has failed conservative measures with chiropractic care for the last year as well as medication and also home exercises with physical therapy.      Subjective      Review of Systems:   Review of Systems   Constitutional: Negative for fatigue and fever.   HENT: Negative for congestion and ear pain.    Respiratory: Negative for apnea, cough, chest tightness and shortness of breath.    Cardiovascular: Negative for chest pain.   Gastrointestinal: Negative for abdominal pain, constipation, diarrhea and nausea.   Musculoskeletal: Negative for arthralgias.   Psychiatric/Behavioral: Negative for depressed mood and stress.       Past Medical History:   Past Medical History:   Diagnosis Date   • DVT (deep venous thrombosis)    • Gallstones    • Kidney stones        Past Surgical History:   Past Surgical History:   Procedure Laterality Date   •  SECTION  2005   • CHOLECYSTECTOMY  2003   • GASTRIC SLEEVE LAPAROSCOPIC  2017   • KNEE ARTHROSCOPY Left 2010   • OTHER SURGICAL HISTORY  2017    IVC Filter (unable to remove) Attempted removal 2018   • PAROTIDECTOMY Right 2017   • TONSILLECTOMY  2014       Family History:   Family History   Problem Relation Age of Onset   • Breast cancer Mother    • Heart disease Father    • Kidney cancer Father    • Breast cancer Paternal  "Aunt    • Throat cancer Paternal Grandfather    • Prostate cancer Paternal Grandfather        Social History:   Social History     Socioeconomic History   • Marital status:    Tobacco Use   • Smoking status: Never   • Smokeless tobacco: Never   Vaping Use   • Vaping Use: Never used   Substance and Sexual Activity   • Alcohol use: No   • Drug use: No   • Sexual activity: Defer       Medications:     Current Outpatient Medications:   •  Calcium Carbonate-Vit D-Min (CALCIUM 1200 PO), Take 1 tablet by mouth 2 (Two) Times a Day., Disp: , Rfl:   •  Cholecalciferol (VITAMIN D) 2000 units capsule, Take 1 capsule by mouth Daily., Disp: , Rfl:   •  Multiple Vitamins-Minerals (MULTIVITAL PO), Take 1 tablet by mouth Daily., Disp: , Rfl:   •  Ubrelvy 100 MG tablet, Take 1 tablet by mouth 1 (One) Time As Needed (headache) for up to 1 dose., Disp: 10 tablet, Rfl: 11    Allergies:   Allergies   Allergen Reactions   • Sulfa Antibiotics Rash       Immunization History   Administered Date(s) Administered   • INFLUENZA SPLIT TRI 11/01/2012     Colorectal Screening:     Last Completed Colonoscopy     This patient has no relevant Health Maintenance data.           Diet/Physical activity: Appropriate diet and physical activity discussed.    Depression: PHQ-2 Depression Screening  Little interest or pleasure in doing things? 0-->not at all   Feeling down, depressed, or hopeless? 0-->not at all   PHQ-2 Total Score 0        Objective     Physical Exam:  Vital Signs:   Vitals:    04/13/23 0817   BP: 128/84   BP Location: Left arm   Patient Position: Sitting   Cuff Size: Adult   Pulse: 77   SpO2: 96%  Comment: RESTING ROOM AIR   Weight: 92.8 kg (204 lb 9.6 oz)   Height: 170.2 cm (67.01\")     Body mass index is 32.04 kg/m².     Physical Exam  Vitals and nursing note reviewed.   Constitutional:       General: She is not in acute distress.     Appearance: Normal appearance. She is not ill-appearing.   HENT:      Head: Normocephalic and " atraumatic.      Right Ear: Tympanic membrane and ear canal normal.      Left Ear: Tympanic membrane and ear canal normal.      Nose: Nose normal.   Cardiovascular:      Rate and Rhythm: Normal rate and regular rhythm.      Heart sounds: Normal heart sounds.   Pulmonary:      Effort: Pulmonary effort is normal.      Breath sounds: Normal breath sounds.   Neurological:      Mental Status: She is alert and oriented to person, place, and time. Mental status is at baseline.   Psychiatric:         Mood and Affect: Mood normal.         Procedures    Assessment / Plan      Assessment/Plan:   Diagnoses and all orders for this visit:    1. Routine general medical examination at a health care facility (Primary)  -     CBC Auto Differential; Future  -     Comprehensive Metabolic Panel; Future  -     Lipid Panel; Future  -     TSH; Future  -     Vitamin D,25-Hydroxy; Future  -     CBC Auto Differential  -     Comprehensive Metabolic Panel  -     Lipid Panel  -     TSH  -     Vitamin D,25-Hydroxy    2. Sciatica of right side  -     MRI Lumbar Spine Without Contrast; Future    3. Vitamin D deficiency  -     Vitamin D,25-Hydroxy; Future  -     Vitamin D,25-Hydroxy    4. Migraine with aura and without status migrainosus, not intractable  -     Ubrelvy 100 MG tablet; Take 1 tablet by mouth 1 (One) Time As Needed (headache) for up to 1 dose.  Dispense: 10 tablet; Refill: 11         Appropriate health maintenance discussed with patient.  Discussed appropriate vaccines and cancer screenings.  Checking appropriate labs for physical today.  Discussed diet and exercise.  She continues to have great deal of low back pain.  Over the last year she has seen the chiropractor many times.  She tried multiple over-the-counter medications.  She is done home exercises for physical therapy.  She continues to have discomfort.  Will obtain MRI of her lower back for further evaluation.  Check vitamin D levels for her prior vitamin D deficiency.  Gave  Ubrelvy for migraine headaches.  Stable on this.      Follow Up:   No follow-ups on file.    Healthcare Maintenance:   Counseling provided on health maintenance discussed with patient.  Emy Lira voices understanding and acceptance of this advice and will call back with any further questions or concerns. AVS with preventive healthcare tips printed for patient.     Anthony Reed MD  [facility] [place]

## 2023-04-23 NOTE — TELEPHONE ENCOUNTER
From: Emy Lira  To: Anthony Reed  Sent: 4/14/2023 9:57 PM EDT  Subject: Test results     How did my blood work come back? Thanks

## 2023-05-27 ENCOUNTER — HOSPITAL ENCOUNTER (OUTPATIENT)
Dept: MRI IMAGING | Facility: HOSPITAL | Age: 48
Discharge: HOME | End: 2023-05-27
Payer: MEDICAID

## 2023-05-27 DIAGNOSIS — M54.31 SCIATICA OF RIGHT SIDE: ICD-10-CM

## 2023-05-27 PROCEDURE — 72148 MRI LUMBAR SPINE W/O DYE: CPT

## 2023-08-10 ENCOUNTER — PATIENT MESSAGE (OUTPATIENT)
Dept: FAMILY MEDICINE CLINIC | Facility: CLINIC | Age: 48
End: 2023-08-10
Payer: MEDICAID

## 2023-08-14 ENCOUNTER — TELEPHONE (OUTPATIENT)
Dept: FAMILY MEDICINE CLINIC | Facility: CLINIC | Age: 48
End: 2023-08-14
Payer: MEDICAID

## 2023-08-14 NOTE — TELEPHONE ENCOUNTER
Patient has been waiting since July to hear back from her Nicki ISSA. Can we please check the status of this and call her with the update please.  Thank you.

## 2023-08-15 NOTE — TELEPHONE ENCOUNTER
Working on PA. Patient sent a [employer] message. Will let her know what is going on in that encounter

## 2023-08-23 ENCOUNTER — TELEPHONE (OUTPATIENT)
Dept: FAMILY MEDICINE CLINIC | Facility: CLINIC | Age: 48
End: 2023-08-23
Payer: MEDICAID

## 2023-08-23 DIAGNOSIS — M54.31 SCIATICA OF RIGHT SIDE: Primary | ICD-10-CM

## 2023-08-23 NOTE — TELEPHONE ENCOUNTER
Caller: Emy Lira    Relationship: Self    Best call back number: [phone]     What is the medical concern/diagnosis: PT CALLED STATED THAT SOME MONTHS AGO PCP WAS SUPPOSE TO SEND OUT A REFERRAL FOR NEUROSURGERY AFTER SHE HAD MRI, AT FIRST PT DID NOT WANT TO GO THROUGH IT, PT NOW WILL LIKE TO PCP TO SEND REFERRAL.        What is the provider, practice or medical service name: PT DOES NOT HAVE SPECIFIC NEUROSURGEON THAT SHE WILL LIKE TO SEE.

## 2023-09-28 ENCOUNTER — OFFICE VISIT (OUTPATIENT)
Dept: NEUROSURGERY | Facility: CLINIC | Age: 48
End: 2023-09-28
Payer: COMMERCIAL

## 2023-09-28 VITALS
BODY MASS INDEX: 33.37 KG/M2 | TEMPERATURE: 97.7 F | SYSTOLIC BLOOD PRESSURE: 110 MMHG | HEIGHT: 67 IN | WEIGHT: 212.6 LBS | DIASTOLIC BLOOD PRESSURE: 80 MMHG

## 2023-09-28 DIAGNOSIS — M25.551 CHRONIC RIGHT HIP PAIN: ICD-10-CM

## 2023-09-28 DIAGNOSIS — G89.29 CHRONIC RIGHT HIP PAIN: ICD-10-CM

## 2023-09-28 DIAGNOSIS — M79.604 LEG PAIN, LATERAL, RIGHT: Primary | ICD-10-CM

## 2023-09-28 DIAGNOSIS — M76.31 IT BAND SYNDROME, RIGHT: ICD-10-CM

## 2023-09-28 RX ORDER — METHYLPREDNISOLONE 4 MG/1
TABLET ORAL
Qty: 21 TABLET | Refills: 0 | Status: SHIPPED | OUTPATIENT
Start: 2023-09-28

## 2023-09-28 NOTE — PROGRESS NOTES
"  Chief complaint: Right lateral thigh pain      History of present illness:  This is a 48-year-old female with extensive past medical history including a number of surgeries as listed below, she has not had back or neck surgery.  She presents today complaining of constant right lateral leg pain for over a year, this presented without an inciting event, and it has become more bothersome over time.  The pain is from the anterolateral hip that extends down to the knee.  She describes this as a deep \"ache\".  She denies radiating pain from the lower back or buttocks, she denies burning, stinging, stabbing pain.    It is aggravated by laying on the right side, especially with pressure on the right hip, and she is unable to sleep on that side.  Her symptoms do not seem to line up with any sort of neurogenic claudication, as she is able to walk, bike, workout a significant amount without needing to stop, although it does increase pain.  She rates this as a background 2/10 pain all the time, and up to 9/10 pain during exacerbations.  She tells me she exercises several times a week.  She also reports that she is able to \"push through the pain\" and is able to walk around and mall/or other activities with family and friends.  Exacerbations may last hours to days after intense activity or may present spontaneously.  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  Patient has tried Tylenol, and ibuprofen with some relief but not much.  The patient has not tried physical therapy or pain management specific to this problem.    Pertinent Medical/Surgical History: Central obesity, gastric bypass surgery, knee and foot surgeries on the contralateral side.    ROS:  A 12 point review of systems was performed.  All systems reviewed were negative with the exception of those mentioned in the history of " "present illness.    Past medical history:  Past Medical History:   Diagnosis Date    DVT (deep venous thrombosis)     Gallstones     Headache     Kidney stones     Low back pain        Past surgical history:  Past Surgical History:   Procedure Laterality Date     SECTION  2005    CHOLECYSTECTOMY  2003    FOOT SURGERY Left     GASTRIC SLEEVE LAPAROSCOPIC  2017    IVC FILTER RETRIEVAL  2017    FIlter is still in place    KNEE ARTHROSCOPY Left 2010    OTHER SURGICAL HISTORY  2017    IVC Filter (unable to remove) Attempted removal 2018    PAROTIDECTOMY Right 2017    TONSILLECTOMY  2014       Social history:  Social History     Socioeconomic History    Marital status:    Tobacco Use    Smoking status: Never    Smokeless tobacco: Never   Vaping Use    Vaping Use: Never used   Substance and Sexual Activity    Alcohol use: No    Drug use: No    Sexual activity: Defer       Family history:  Family History   Problem Relation Age of Onset    Cancer Mother     Arthritis Mother     Breast cancer Mother     Kidney disease Father     Hypertension Father     Cancer Father     Arthritis Father     Heart disease Father     Kidney cancer Father     Breast cancer Paternal Aunt     Throat cancer Paternal Grandfather     Prostate cancer Paternal Grandfather        Allergies:  Allergies   Allergen Reactions    Sulfa Antibiotics Rash       Outpatient medications:  Scheduled Meds:  Continuous Infusions:No current facility-administered medications for this visit.      Physical Examination:    /80 (BP Location: Right arm, Patient Position: Sitting, Cuff Size: Adult)   Temp 97.7 °F (36.5 °C) (Infrared)   Ht 170.2 cm (67\")   Wt 96.4 kg (212 lb 9.6 oz)   BMI 33.30 kg/m²       General: The patient is alert, conversational, pleasant.  Neurological: Cranial nerves II through XII are grossly intact. Facial expressions are equal bilaterally.     Eyes: Extraocular eye movements are " intact, and pupils are equal, round, and reactive to light.  Musculoskeletal:   Upper extremity strength is 5/5  Lower extremity strength is 5/5 in hip flexion/extension.  Dorsiflexion/plantarflexion.  Eversion/inversion.  Internal and external rotation of both hips does not elicit pain.  Palpation of the hips is difficult due to body habitus, however does not elicit great pain on either side.  Sensation: Deep ache/pain in the right anterolateral thigh.    There is no Clonus noted on achilles stretch.    Casual gait, heel raise, toe raise, and tandem gait testing are all performed without issue.   Romberg sign is negative.    Knee jerk reflexes are 2+ on the right and absent on the left.  Ankle jerk reflexes are 2+ bilaterally    HEENT: Normocephalic, atraumatic.   Cardiovascular: Regular rate and rhythm.  No edema noted  Pulmonary: Normal respiratory effort  Abdomen: Central obesity, nontender  Psychiatric: Normal mood and affect        Imaging:  An MRI of the lumbar spine is available for my review. There is preserved lumbar lordosis, and no obvious spondylolisthesis.  Disc spacing is preserved with the exception of level L5-S1 which shows a disc bulge most significant for moderate to severe left and right neuroforaminal narrowing.  Her symptoms do not align with L5 or S1 radiculopathy.  Other diffuse degenerative and arthritic changes are noted but do not appear to interfere with spinal canal or nerve roots.      Assessment and Plan:  Diagnoses and all orders for this visit:    1. Leg pain, lateral, right (Primary)  -     Ambulatory Referral to Pain Management  -     Ambulatory Referral to Physical Therapy Evaluate and treat  -     Cancel: XR Spine Lumbar AP & Lateral With Flex & Ext  -     XR hips bilateral w or wo pelvis 2 view; Future    2. Chronic right hip pain  -     Ambulatory Referral to Pain Management  -     Ambulatory Referral to Physical Therapy Evaluate and treat  -     Cancel: XR Spine Lumbar AP &  Lateral With Flex & Ext  -     XR hips bilateral w or wo pelvis 2 view; Future    3. It band syndrome, right  -     Ambulatory Referral to Pain Management  -     Ambulatory Referral to Physical Therapy Evaluate and treat  -     Cancel: XR Spine Lumbar AP & Lateral With Flex & Ext  -     XR hips bilateral w or wo pelvis 2 view; Future    Other orders  -     methylPREDNISolone (MEDROL) 4 MG dose pack; Take as directed on package instructions.  Dispense: 21 tablet; Refill: 0    Differential diagnosis includes meralgia paresthetica/lateral femoral cutaneous neuralgia.  Due to the constant nature of the pain in a localized anterior femoral pattern, and the absence of classic radicular symptoms in other patterns.    If this is meralgia paresthetica or related pathology, then the patient may benefit from a decompressive surgery, however that is not a service that is often performed here, and would have to be performed elsewhere.  I provided methylprednisone in hopes to reduce inflammation around peripheral nerve roots which may be compressed.    I discussed the general plan the patient including assessment and treatment by physical therapy, and pain management.  She is aware of her weight and is working on continued exercise and reducing weight.  She seems amenable to the plan of physical therapy and conservative therapy with pain management.    SHANDRA Farrar PA-C    Approximately 50 minutes were spent reviewing other notes from this patient's chart.  Taking history, performing exam, collaborating with other healthcare professionals, and dictation.  09/28/23

## 2023-10-03 ENCOUNTER — PATIENT MESSAGE (OUTPATIENT)
Dept: FAMILY MEDICINE CLINIC | Facility: CLINIC | Age: 48
End: 2023-10-03
Payer: COMMERCIAL

## 2023-10-09 NOTE — TELEPHONE ENCOUNTER
From: Emy Lira  To: Anthony Reed  Sent: 10/3/2023 12:59 PM EDT  Subject: X-ray results     I finally got into the neurosurgeon office. They had me do an X-ray. The results are in. He says it was fine and showed nothing. When I questioned what it did show he doesn't really know what to do with me other than offer pain meds. I'm starting PT Thursday and waiting on pain management for an appointment. Is there more I need to or can do. I'm not all about pain meds. I truly just want it to feel better.  Thanks,  Emy

## 2023-10-31 ENCOUNTER — TELEPHONE (OUTPATIENT)
Dept: FAMILY MEDICINE CLINIC | Facility: CLINIC | Age: 48
End: 2023-10-31
Payer: COMMERCIAL

## 2023-10-31 DIAGNOSIS — M54.31 SCIATICA OF RIGHT SIDE: Primary | ICD-10-CM

## 2023-10-31 NOTE — TELEPHONE ENCOUNTER
Caller: Emy Lira    Relationship: Self    Best call back number: [phone]     What is the medical concern/diagnosis:  RIGHT LEG AND HIP PAIN    What specialty or service is being requested: NEUROLOGY    What is the provider, practice or medical service name: DOES NOT MATTER    What is the office location: ANY    What is the office phone number: ANY    Any additional details:

## 2023-11-02 NOTE — TELEPHONE ENCOUNTER
She said the PA at neurosurgery did have anything to offer, the physical therapist she has been seeing seems to think its more nerve pain and wanted her to see a neurologist, she was seeing PT in the morning and was going to request some of their progress reports to be faxed over.

## 2023-11-07 NOTE — TELEPHONE ENCOUNTER
Typically not something that neurology typically deals with.  Okay to make the referral if she still wants it.  Typically we would send to pain management.  They may have something more to offer.

## 2023-11-08 NOTE — TELEPHONE ENCOUNTER
Called patientShe has been seeing physical therapy and they believe it is nerve related. She doesn't want to mask the issue, she wants to know why she is having the issue. She is wanting a nerve conduction test and thought neurology does these. Does pain management do these? She has a pending referral for pain management that is from neurosurgery, but she hasn't gotten a call yet to schedule.

## 2023-12-07 ENCOUNTER — OFFICE VISIT (OUTPATIENT)
Dept: FAMILY MEDICINE CLINIC | Facility: CLINIC | Age: 48
End: 2023-12-07
Payer: COMMERCIAL

## 2023-12-07 VITALS
BODY MASS INDEX: 35 KG/M2 | DIASTOLIC BLOOD PRESSURE: 104 MMHG | SYSTOLIC BLOOD PRESSURE: 140 MMHG | HEIGHT: 67 IN | OXYGEN SATURATION: 98 % | WEIGHT: 223 LBS | HEART RATE: 84 BPM

## 2023-12-07 DIAGNOSIS — M77.11 LATERAL EPICONDYLITIS OF RIGHT ELBOW: Primary | ICD-10-CM

## 2023-12-07 PROCEDURE — 99213 OFFICE O/P EST LOW 20 MIN: CPT | Performed by: FAMILY MEDICINE

## 2023-12-07 RX ORDER — PREDNISONE 20 MG/1
TABLET ORAL
Qty: 18 TABLET | Refills: 0 | Status: SHIPPED | OUTPATIENT
Start: 2023-12-07

## 2023-12-07 NOTE — PROGRESS NOTES
Follow Up Office Visit      Date of Visit:  2023   Patient Name: Emy Lira  : 1975   MRN: [id number]     Chief Complaint:    Chief Complaint   Patient presents with    elbow pain     right       History of Present Illness: Emy Lira is a 48 y.o. female who is here today for follow up.  Patient seen today for right elbow pain.  Present for the past week or so.  No specific injury.  Located laterally.  Somewhat worse with lifting or gripping.        Subjective      Review of Systems:   Review of Systems   Constitutional:  Negative for fatigue and fever.   HENT:  Negative for congestion and ear pain.    Respiratory:  Negative for apnea, cough, chest tightness and shortness of breath.    Cardiovascular:  Negative for chest pain.   Gastrointestinal:  Negative for abdominal pain, constipation, diarrhea and nausea.   Musculoskeletal:  Positive for arthralgias.   Psychiatric/Behavioral:  Negative for depressed mood and stress.        Past Medical History:   Past Medical History:   Diagnosis Date    DVT (deep venous thrombosis)     Gallstones     Headache     Kidney stones     Low back pain        Past Surgical History:   Past Surgical History:   Procedure Laterality Date     SECTION  2005    CHOLECYSTECTOMY  2003    FOOT SURGERY Left     GASTRIC SLEEVE LAPAROSCOPIC  2017    IVC FILTER RETRIEVAL  2017    FIlter is still in place    KNEE ARTHROSCOPY Left 2010    OTHER SURGICAL HISTORY  2017    IVC Filter (unable to remove) Attempted removal 2018    PAROTIDECTOMY Right 2017    TONSILLECTOMY  2014       Family History:   Family History   Problem Relation Age of Onset    Cancer Mother     Arthritis Mother     Breast cancer Mother     Kidney disease Father     Hypertension Father     Cancer Father     Arthritis Father     Heart disease Father     Kidney cancer Father     Breast cancer Paternal Aunt     Throat cancer Paternal Grandfather     Prostate cancer  "Paternal Grandfather        Social History:   Social History     Socioeconomic History    Marital status:    Tobacco Use    Smoking status: Never    Smokeless tobacco: Never   Vaping Use    Vaping Use: Never used   Substance and Sexual Activity    Alcohol use: No    Drug use: No    Sexual activity: Defer       Medications:     Current Outpatient Medications:     Calcium Carbonate-Vit D-Min (CALCIUM 1200 PO), Take 1 tablet by mouth 2 (Two) Times a Day., Disp: , Rfl:     Cholecalciferol (VITAMIN D) 2000 units capsule, Take 1 capsule by mouth Daily., Disp: , Rfl:     methylPREDNISolone (MEDROL) 4 MG dose pack, Take as directed on package instructions., Disp: 21 tablet, Rfl: 0    Multiple Vitamins-Minerals (MULTIVITAL PO), Take 1 tablet by mouth Daily., Disp: , Rfl:     predniSONE (DELTASONE) 20 MG tablet, 3 po qd x3 d then 2 po qd x3d then 1 po qd x3d, Disp: 18 tablet, Rfl: 0    Ubrelvy 100 MG tablet, Take 1 tablet by mouth 1 (One) Time As Needed (headache) for up to 1 dose., Disp: 10 tablet, Rfl: 11    Allergies:   Allergies   Allergen Reactions    Sulfa Antibiotics Rash       Objective     Physical Exam:  Vital Signs:   Vitals:    12/07/23 1107   BP: (!) 140/104   Pulse: 84   SpO2: 98%   Weight: 101 kg (223 lb)   Height: 170.2 cm (67\")     Body mass index is 34.93 kg/m².     Physical Exam  Vitals and nursing note reviewed.   Constitutional:       General: She is not in acute distress.     Appearance: Normal appearance. She is not ill-appearing.   HENT:      Head: Normocephalic and atraumatic.      Right Ear: Tympanic membrane and ear canal normal.      Left Ear: Tympanic membrane and ear canal normal.      Nose: Nose normal.   Cardiovascular:      Rate and Rhythm: Normal rate and regular rhythm.      Heart sounds: Normal heart sounds.   Pulmonary:      Effort: Pulmonary effort is normal.      Breath sounds: Normal breath sounds.   Neurological:      Mental Status: She is alert and oriented to person, place, " and time. Mental status is at baseline.   Psychiatric:         Mood and Affect: Mood normal.         Procedures      Assessment / Plan      Assessment/Plan:   Diagnoses and all orders for this visit:    1. Lateral epicondylitis of right elbow (Primary)    Other orders  -     predniSONE (DELTASONE) 20 MG tablet; 3 po qd x3 d then 2 po qd x3d then 1 po qd x3d  Dispense: 18 tablet; Refill: 0         Suspect tendinitis.  Gave steroid and use ice.    Follow Up:   No follow-ups on file.    Anthony Reed  [facility]

## 2023-12-20 ENCOUNTER — OFFICE VISIT (OUTPATIENT)
Dept: PAIN MEDICINE | Facility: CLINIC | Age: 48
End: 2023-12-20
Payer: COMMERCIAL

## 2023-12-20 VITALS — HEIGHT: 67 IN | BODY MASS INDEX: 34.62 KG/M2 | WEIGHT: 220.6 LBS

## 2023-12-20 DIAGNOSIS — M70.61 TROCHANTERIC BURSITIS OF RIGHT HIP: Primary | ICD-10-CM

## 2023-12-20 PROCEDURE — 99204 OFFICE O/P NEW MOD 45 MIN: CPT | Performed by: STUDENT IN AN ORGANIZED HEALTH CARE EDUCATION/TRAINING PROGRAM

## 2023-12-20 NOTE — PROGRESS NOTES
Referring Physician: Willem Farrar PA-C  [address] [facility]  [address] [place] [id number]    Primary Physician: Anthony Reed MD    CHIEF COMPLAINT or REASON FOR VISIT: Leg Pain and Hip Pain (New patient)      Initial history of present illness on 2023:  Ms. Emy Lira is 48 y.o. female who presents as a new patient referral for evaluation treatment of chronic right-sided lateral thigh pain.  Pain is ongoing for greater than 6 months.  She did have a gastric sleeve in 2017, lost significant weight and subsequently developed increasing right-sided lateral thigh/hip pain without any inciting event or trauma.  She has tried acetaminophen, NSAIDs, physical therapy, heat therapy without significant benefit.  She does attend chiropractic.  She has difficulty laying on her right side without causing an achy tingly pain on the lateral aspect of her thigh.  She has never had any injection.  She has tried a Medrol Dosepak without benefit.  Patient denies any bowel or bladder dysfunction, lower extremity weakness, new onset saddle anesthesia or unexplained weight loss.   She was evaluated by neurosurgery, Tk Aguero PA-C, who referred for pain management.  She does have a lumbar MRI.    Interval history:    Interventions:      Objective Pain Scoring:   BRIEF PAIN INVENTORY:  Total score:   Pain Score    23 0732   PainSc:   2   PainLoc: Hip      PHQ-2: PHQ-2 Total Score: 0  PHQ-9: PHQ-9: Brief Depression Severity Measure Score: 0  Opioid Risk Tool:         Review of Systems:   ROS negative except as otherwise noted     Past Medical History:   Past Medical History:   Diagnosis Date    Chronic pain disorder     Cluster headache     DVT (deep venous thrombosis)     Gallstones     Headache     Headache, tension-type     Kidney stones     Low back pain     Migraine          Past Surgical History:   Past Surgical History:   Procedure Laterality Date     SECTION  2005     CHOLECYSTECTOMY  02/2003    EPIDURAL BLOCK  9/28/2002 and 6/7/2005    FOOT SURGERY Left 2020    GASTRIC SLEEVE LAPAROSCOPIC  11/13/2017    IVC FILTER RETRIEVAL  2017    FIlter is still in place    KNEE ARTHROSCOPY Left 12/2010    ORTHOPEDIC SURGERY      OTHER SURGICAL HISTORY  11/13/2017    IVC Filter (unable to remove) Attempted removal 4/2018    PAROTIDECTOMY Right 06/26/2017    TONSILLECTOMY  04/2014         Family History   Family History   Problem Relation Age of Onset    Cancer Mother         Breast    Arthritis Mother     Breast cancer Mother     Kidney disease Father     Hypertension Father     Cancer Father         Kidney, prostrate    Arthritis Father     Heart disease Father     Kidney cancer Father     Breast cancer Paternal Aunt     Throat cancer Paternal Grandfather     Prostate cancer Paternal Grandfather     Cancer Paternal Grandfather         Throat         Social History   Social History     Socioeconomic History    Marital status:    Tobacco Use    Smoking status: Never    Smokeless tobacco: Never   Vaping Use    Vaping Use: Never used   Substance and Sexual Activity    Alcohol use: No    Drug use: No    Sexual activity: Yes     Partners: Male     Birth control/protection: Surgical        Medications:     Current Outpatient Medications:     Calcium Carbonate-Vit D-Min (CALCIUM 1200 PO), Take 1 tablet by mouth 2 (Two) Times a Day., Disp: , Rfl:     Cholecalciferol (VITAMIN D) 2000 units capsule, Take 1 capsule by mouth Daily., Disp: , Rfl:     Multiple Vitamins-Minerals (MULTIVITAL PO), Take 1 tablet by mouth Daily., Disp: , Rfl:     Ubrelvy 100 MG tablet, Take 1 tablet by mouth 1 (One) Time As Needed (headache) for up to 1 dose., Disp: 10 tablet, Rfl: 11    methylPREDNISolone (MEDROL) 4 MG dose pack, Take as directed on package instructions., Disp: 21 tablet, Rfl: 0    predniSONE (DELTASONE) 20 MG tablet, 3 po qd x3 d then 2 po qd x3d then 1 po qd x3d (Patient not taking: Reported on  "12/20/2023), Disp: 18 tablet, Rfl: 0        Physical Exam:     Vitals:    12/20/23 0732   Weight: 100 kg (220 lb 9.6 oz)   Height: 170.2 cm (67\")   PainSc:   2   PainLoc: Hip        General: Alert and oriented, No acute distress.   HEENT: Normocephalic, atraumatic.   Cardiovascular: No gross edema  Respiratory: Respirations are non-labored    Lumbar Spine:   No masses or atrophy  Range of motion - Flexion normal. Extension normal.    Facet Loading: Negative bilaterally  Facet Palpation - Nontender   TTP right greater trochanter    Motor Exam:    Strength: Rate on 1-5 scale Right Left    C5-Elbow flexion, Deltoid 5 5    C6-Wrist extension 5 5    C7- Elbow / finger extension 5 5    C8- Finger flexion 5 5    T1- [employer] hand 5 5    Strength: Rate on 1-5 scale Right Left    L1/2- hip flexion 5 5    L3- knee extension 5 5    L4- ankle dorsiflexion 5 5    L5- great toe extension 5 5    S1- ankle plantarflexion 5 5    Sensory Exam: Full and equal sensation to light touch throughout.    Neurologic: Cranial Nerves II-XII are grossly intact.   Psychiatric: Cooperative.   Gait: Normal   Assistive Devices: None    Imaging Studies:   Results for orders placed during the hospital encounter of 05/27/23    MRI Lumbar Spine Without Contrast    Narrative  MRI LUMBAR SPINE WO CONTRAST    Date of Exam: 5/27/2023 1:41 PM EDT    Indication: cont. sciatica and hip pain.    Comparison: None available.    Technique:  Routine multiplanar/multisequence sequence images of the lumbar spine were obtained without contrast administration.    Findings:  T1 marrow signal is preserved, without evidence of fracture or suspicious marrow replacing lesion. Alignment is anatomic, without evidence of significant listhesis or subluxation. The conus medullaris and cauda equina nerve roots are satisfactory in  appearance. The paraspinal soft tissues demonstrate no acute or suspicious findings. Multilevel spondylosis is present, with areas of involvement " including    L1-2, no significant spinal canal or neuroforaminal impingement.    L2-3, no significant spinal canal or neuroforaminal impingement.    L3-4, no significant spinal canal or neuroforaminal impingement..    L4-5, ligamentum flavum thickening and small disc bulge, with bilateral facet arthropathy. The spinal canal is patent. There is mild bilateral neuroforaminal narrowing present, greater on the left.    L5-S1, small disc bulge, bilateral facet arthropathy and mild ligamentum flavum thickening. The spinal canal is patent. There is moderate to severe left and moderate right neuroforaminal narrowing.    Impression  Impression:  Multilevel lumbar spondylosis is noted, somewhat focally notable at the L5-S1 level where disc bulge and facet arthropathy result in moderate to severe narrowing of the left neural foramen. The spinal canal is patent throughout.    Electronically Signed: Kendell Cifuentes  5/27/2023 9:16 PM EDT  Workstation ID: [id number]      Impression & Plan:       12/20/2023: Emy Lira is a 48 y.o. female with past medical history significant for gastric sleeve surgery 2017, who presents to the pain clinic for evaluation and treatment of chronic right lateral thigh pain.  I personally reviewed and interpreted her lumbar MRI dated May 27, 2023 demonstrating: L5/S1 degenerative disc disease without Modic changes; L4/5 mild DDD; moderate bilateral L5/S1 NFS; patent canal.  Examination consistent with right trochanteric bursitis, possible meralgia paresthetica.  We discussed trochanteric steroid injection to improve pain.  If greater than 50% relief for at least 2-3 months can consider repeat as needed every 3 to 4 months.  I had a discussion with the patient regarding the risks of the procedure including bleeding, infection, damage to surrounding structures.  We discussed the potential adverse effects of corticosteroid injection including flushing of the face, lipodystrophy, skin discoloration,  elevated blood glucose, increased blood pressure.  Risks of frequent steroid administration include weight gain, hormonal changes, mood changes, osteoporosis.    1. Trochanteric bursitis of right hip        PLAN:  1. Medication Recommendations: Recommend Voltaren topical, NSAIDs, Tylenol.  Can trial turmeric 500 mg twice daily if NSAID contraindicated.    2. Physical Therapy: Continue HEP.  Patient advised to continue stretching and weight loss to prevent recurrence    3. Psychological: defer    4. Complementary and alternative (CAM) Therapies:     5. Labs/Diagnostic studies: None indicated     6. Imaging: MRI independently interpreted and reviewed with patient    7. Interventions: Schedule right trochanteric bursa steroid injection (20610).    8. Referrals: None indicated     9. Records: Neurosurgical notes reviewed    10. Lifestyle goals: Continue weight loss    Follow-up 2 months after injection      [facility]  Noah Morales MD

## 2024-01-04 ENCOUNTER — OUTSIDE FACILITY SERVICE (OUTPATIENT)
Dept: PAIN MEDICINE | Facility: CLINIC | Age: 49
End: 2024-01-04
Payer: COMMERCIAL

## 2024-02-21 ENCOUNTER — OFFICE VISIT (OUTPATIENT)
Dept: FAMILY MEDICINE CLINIC | Facility: CLINIC | Age: 49
End: 2024-02-21
Payer: COMMERCIAL

## 2024-02-21 VITALS
BODY MASS INDEX: 34.53 KG/M2 | HEART RATE: 87 BPM | OXYGEN SATURATION: 98 % | DIASTOLIC BLOOD PRESSURE: 80 MMHG | SYSTOLIC BLOOD PRESSURE: 140 MMHG | HEIGHT: 67 IN | WEIGHT: 220 LBS

## 2024-02-21 DIAGNOSIS — I26.92 SADDLE EMBOLUS OF PULMONARY ARTERY, UNSPECIFIED CHRONICITY, UNSPECIFIED WHETHER ACUTE COR PULMONALE PRESENT: Primary | ICD-10-CM

## 2024-02-21 RX ORDER — RIVAROXABAN 20 MG/1
20 TABLET, FILM COATED ORAL
Qty: 30 TABLET | Refills: 11 | Status: SHIPPED | OUTPATIENT
Start: 2024-02-21

## 2024-02-21 RX ORDER — RIVAROXABAN 20 MG/1
TABLET, FILM COATED ORAL
COMMUNITY
Start: 2024-01-04 | End: 2024-02-21 | Stop reason: SDUPTHER

## 2024-02-21 NOTE — PROGRESS NOTES
Follow Up Office Visit      Date of Visit:  2024   Patient Name: Emy Lira  : 1975   MRN: [id number]     Chief Complaint:  No chief complaint on file.      History of Present Illness: Emy Lira is a 48 y.o. female who is here today for follow up.  Patient seeing us for a hospital follow-up.  Patient was initially hospitalized last couple days of December through beginning of January with saddle embolism.  Complicated hospitalization.  Patient with prior PE years ago.  Patient with IVC filter that is displaced.  Unable to remove at hospitalization in [place].  Did see hematology when visit after the hospitalization to start a hypercoagulability workup but that physician is leaving the practice in [facility] she has not had any follow-up and they have not answered a couple phone calls.  Unsure from discharge summary if any cardiac evaluation was done sure there was no strain on the heart with the blood clots.        Subjective      Review of Systems:   Review of Systems   Constitutional:  Positive for fatigue. Negative for fever.   HENT:  Negative for congestion and ear pain.    Respiratory:  Positive for shortness of breath. Negative for apnea, cough and chest tightness.    Cardiovascular:  Negative for chest pain.   Gastrointestinal:  Negative for abdominal pain, constipation, diarrhea and nausea.   Musculoskeletal:  Negative for arthralgias.   Psychiatric/Behavioral:  Negative for depressed mood and stress.        Past Medical History:   Past Medical History:   Diagnosis Date    Arthritis 2013    Chronic pain disorder     Cluster headache     DVT (deep venous thrombosis)     Gallstones     Headache     Headache, tension-type     Kidney stones     Low back pain     Migraine        Past Surgical History:   Past Surgical History:   Procedure Laterality Date    BARIATRIC SURGERY  2027    BREAST SURGERY      Biopsy. Unsure date     SECTION  2005    CHOLECYSTECTOMY  2003     EPIDURAL BLOCK  9/28/2002 and 6/7/2005    FOOT SURGERY Left 2020    GASTRIC SLEEVE LAPAROSCOPIC  11/13/2017    IVC FILTER RETRIEVAL  2017    FIlter is still in place    KNEE ARTHROSCOPY Left 12/2010    ORTHOPEDIC SURGERY      OTHER SURGICAL HISTORY  11/13/2017    IVC Filter (unable to remove) Attempted removal 4/2018    PAROTIDECTOMY Right 06/26/2017    TONSILLECTOMY  04/2014    TUBAL ABDOMINAL LIGATION  6/2005       Family History:   Family History   Problem Relation Age of Onset    Cancer Mother         Breast    Arthritis Mother     Breast cancer Mother     Hearing loss Mother     Kidney disease Father     Hypertension Father     Cancer Father         Kidney, prostrate    Arthritis Father     Heart disease Father     Kidney cancer Father     Breast cancer Paternal Aunt     Throat cancer Paternal Grandfather     Prostate cancer Paternal Grandfather     Cancer Paternal Grandfather         Throat       Social History:   Social History     Socioeconomic History    Marital status:    Tobacco Use    Smoking status: Never    Smokeless tobacco: Never   Vaping Use    Vaping Use: Never used   Substance and Sexual Activity    Alcohol use: No    Drug use: No    Sexual activity: Yes     Partners: Male     Birth control/protection: Surgical       Medications:     Current Outpatient Medications:     Xarelto 20 MG tablet, Take 1 tablet by mouth Daily With Dinner., Disp: 30 tablet, Rfl: 11    Calcium Carbonate-Vit D-Min (CALCIUM 1200 PO), Take 1 tablet by mouth 2 (Two) Times a Day., Disp: , Rfl:     Cholecalciferol (VITAMIN D) 2000 units capsule, Take 1 capsule by mouth Daily., Disp: , Rfl:     methylPREDNISolone (MEDROL) 4 MG dose pack, Take as directed on package instructions., Disp: 21 tablet, Rfl: 0    Multiple Vitamins-Minerals (MULTIVITAL PO), Take 1 tablet by mouth Daily., Disp: , Rfl:     predniSONE (DELTASONE) 20 MG tablet, 3 po qd x3 d then 2 po qd x3d then 1 po qd x3d (Patient not taking: Reported on  "12/20/2023), Disp: 18 tablet, Rfl: 0    Ubrelvy 100 MG tablet, Take 1 tablet by mouth 1 (One) Time As Needed (headache) for up to 1 dose., Disp: 10 tablet, Rfl: 11    Allergies:   Allergies   Allergen Reactions    Sulfa Antibiotics Rash       Objective     Physical Exam:  Vital Signs:   Vitals:    02/21/24 1308   BP: 140/80   Pulse: 87   SpO2: 98%   Weight: 99.8 kg (220 lb)   Height: 170.2 cm (67\")     Body mass index is 34.46 kg/m².     Physical Exam  Vitals and nursing note reviewed.   Constitutional:       General: She is not in acute distress.     Appearance: Normal appearance. She is not ill-appearing.   HENT:      Head: Normocephalic and atraumatic.      Right Ear: Tympanic membrane and ear canal normal.      Left Ear: Tympanic membrane and ear canal normal.      Nose: Nose normal.   Cardiovascular:      Rate and Rhythm: Normal rate and regular rhythm.      Heart sounds: Normal heart sounds.   Pulmonary:      Effort: Pulmonary effort is normal.      Breath sounds: Normal breath sounds.   Neurological:      Mental Status: She is alert and oriented to person, place, and time. Mental status is at baseline.   Psychiatric:         Mood and Affect: Mood normal.         Procedures      Assessment / Plan      Assessment/Plan:   Diagnoses and all orders for this visit:    1. Saddle embolus of pulmonary artery, unspecified chronicity, unspecified whether acute cor pulmonale present (Primary)  -     Ambulatory Referral to Hematology / Oncology  -     Cancel: Ambulatory Referral to Cardiology  -     Ambulatory Referral to Cardiology    Other orders  -     Xarelto 20 MG tablet; Take 1 tablet by mouth Daily With Dinner.  Dispense: 30 tablet; Refill: 11         Since she has not had a consistent follow-up with hematology I am making an appointment with a different hematologist.  The when she saw the initial visit after the hospitalization is no longer with the practice in [place].  I think things may have just gotten lost " in the shuffle.  Also making appointment with cardiology for evaluation of her cardiac function.  I think she will need to see an interventional radiologist or possible vascular surgeon as it relates to the IVC filter.  Will first see cardiology and hematology and see what recommendations may be.  Continue Xarelto.    Follow Up:   No follow-ups on file.    Anthony Reed  [facility]

## 2024-02-22 ENCOUNTER — TELEPHONE (OUTPATIENT)
Dept: FAMILY MEDICINE CLINIC | Facility: CLINIC | Age: 49
End: 2024-02-22
Payer: COMMERCIAL

## 2024-02-22 NOTE — TELEPHONE ENCOUNTER
Caller: Emy Lira    Relationship to patient: Self    Best call back number: [phone]     Patient is needing: PATIENT CALLED IN TO NOTIFY US THAT THEY ARE UNABLE TO REQUEST THE NECESSARY MEDICAL FILED FROM [facility] THEMSELVES TO BE SENT TO US, AS THEY HAVE STATED WE WOULD NEED TO REQUEST THE FILE FROM OUR END INSTEAD    PATIENT WAS TOLD THAT WE WOULD NEED TO REQUEST ALL RECORDS PERTAINING TO HER FROM THAT FACILITY    PATIENT STATES THAT THESE RECORDS SHOULD COVER FROM 12.30.23 TO 1.4.24     PHONE # FOR FACILITY: [phone]    FAX # FOR MEDICAL RECORDS DPT: [phone]

## 2024-02-23 NOTE — TELEPHONE ENCOUNTER
Spoke to med records at [facility]. They are faxing over the information from this visit.

## 2024-03-04 ENCOUNTER — OFFICE VISIT (OUTPATIENT)
Dept: CARDIOLOGY | Facility: CLINIC | Age: 49
End: 2024-03-04
Payer: COMMERCIAL

## 2024-03-04 VITALS
DIASTOLIC BLOOD PRESSURE: 72 MMHG | HEART RATE: 86 BPM | SYSTOLIC BLOOD PRESSURE: 114 MMHG | BODY MASS INDEX: 35.47 KG/M2 | OXYGEN SATURATION: 99 % | WEIGHT: 226 LBS | HEIGHT: 67 IN

## 2024-03-04 DIAGNOSIS — I51.7 ENLARGED RV (RIGHT VENTRICLE): ICD-10-CM

## 2024-03-04 DIAGNOSIS — Z86.711 HISTORY OF PULMONARY EMBOLISM: Primary | ICD-10-CM

## 2024-03-04 DIAGNOSIS — I82.402 DEEP VEIN THROMBOSIS (DVT) OF LEFT LOWER EXTREMITY, UNSPECIFIED CHRONICITY, UNSPECIFIED VEIN: ICD-10-CM

## 2024-03-04 PROCEDURE — 99204 OFFICE O/P NEW MOD 45 MIN: CPT | Performed by: INTERNAL MEDICINE

## 2024-03-04 PROCEDURE — 93000 ELECTROCARDIOGRAM COMPLETE: CPT | Performed by: INTERNAL MEDICINE

## 2024-03-04 NOTE — PROGRESS NOTES
OFFICE VISIT  NOTE  [facility] CARDIOLOGY      Name: Emy Lira    Date: 3/4/2024  MRN:  [id number]  :  1975      REFERRING/PRIMARY PROVIDER:  Anthony Reed MD    Chief Complaint   Patient presents with    saddle embolus of pulmonary artery        HPI: Emy Lira is a 48 y.o. female who presents for history of pulmonary embolus 2023.  Echo ([facility]) at that time showed normal EF with normal RV size and function, with IVC thrombus.  History of PE in , and DVT in  she had an IVC filter placed in  they attempted to remove it in 2018 in [place] but were unsuccessful, recent IVC venogram and thrombectomy 2023 at [facility] reported significant thrombus above the IVC filter which was removed with penumbra catheter, she had a saddle embolus at that time with extensive RV strain within RV to LV ratio of 1.8.  Inferior venogram reported disc formed IVC filter with at least 1 leg penetrating through the IVC, currently she has a referral at  to find a specialist to remove the filter.  She is tolerating Xarelto 20 mg daily, still fatigued and gets short of breath    Past Medical History:   Diagnosis Date    Arthritis 2013    Chronic pain disorder     Cluster headache     DVT (deep venous thrombosis)     Gallstones     Headache     Headache, tension-type     Kidney stones     Low back pain     Migraine        Past Surgical History:   Procedure Laterality Date    BARIATRIC SURGERY  2027    BREAST SURGERY      Biopsy. Unsure date     SECTION  2005    CHOLECYSTECTOMY  2003    EPIDURAL BLOCK  2002 and 2005    FOOT SURGERY Left     GASTRIC SLEEVE LAPAROSCOPIC  2017    IVC FILTER RETRIEVAL  2017    FIlter is still in place    KNEE ARTHROSCOPY Left 2010    ORTHOPEDIC SURGERY  10-    OTHER SURGICAL HISTORY  2017    IVC Filter (unable to remove) Attempted removal 2018    PAROTIDECTOMY Right 2017     TONSILLECTOMY  04/2014    TUBAL ABDOMINAL LIGATION  6/2005       Social History     Socioeconomic History    Marital status:    Tobacco Use    Smoking status: Never    Smokeless tobacco: Never   Vaping Use    Vaping status: Never Used   Substance and Sexual Activity    Alcohol use: No    Drug use: No    Sexual activity: Yes     Partners: Male     Birth control/protection: Surgical       Family History   Problem Relation Age of Onset    Cancer Mother         Breast    Arthritis Mother     Breast cancer Mother     Hearing loss Mother     Kidney disease Father     Hypertension Father     Cancer Father         Kidney, prostrate    Arthritis Father     Heart disease Father     Kidney cancer Father     Breast cancer Paternal Aunt     Throat cancer Paternal Grandfather     Prostate cancer Paternal Grandfather     Cancer Paternal Grandfather         Throat        ROS:   Constitutional no fever,  no weight loss   Skin no rash, no subcutaneous nodules   Otolaryngeal no difficulty swallowing   Cardiovascular See HPI   Pulmonary no cough, no sputum production   Gastrointestinal no constipation, no diarrhea   Genitourinary no dysuria, no hematuria   Hematologic no easy bruisability, no abnormal bleeding   Musculoskeletal no muscle pain   Neurologic no dizziness, no falls         Allergies   Allergen Reactions    Enoxaparin Unknown - Low Severity    Sulfonylureas Unknown - Low Severity    Sulfa Antibiotics Rash         Current Outpatient Medications:     Calcium Carbonate-Vit D-Min (CALCIUM 1200 PO), Take 1 tablet by mouth 2 (Two) Times a Day., Disp: , Rfl:     Cholecalciferol (VITAMIN D) 2000 units capsule, Take 1 capsule by mouth Daily., Disp: , Rfl:     Multiple Vitamins-Minerals (MULTIVITAL PO), Take 1 tablet by mouth Daily., Disp: , Rfl:     Ubrelvy 100 MG tablet, Take 1 tablet by mouth 1 (One) Time As Needed (headache) for up to 1 dose., Disp: 10 tablet, Rfl: 11    Xarelto 20 MG tablet, Take 1 tablet by mouth  "Daily With Dinner., Disp: 30 tablet, Rfl: 11    Vitals:    03/04/24 1309   BP: 114/72   BP Location: Right arm   Patient Position: Sitting   Pulse: 86   SpO2: 99%   Weight: 103 kg (226 lb)   Height: 170.2 cm (67\")     Body mass index is 35.4 kg/m².    PHYSICAL EXAM:    General Appearance:   well developed  well nourished  HENT:   oropharynx moist  lips not cyanotic  Neck:  thyroid not enlarged  supple  Respiratory:  no respiratory distress  normal breath sounds  no rales  Cardiovascular:  no jugular venous distention  regular rhythm  apical impulse normal  S1 normal, S2 normal  no S3, no S4   no murmur  no rub, no thrill  carotid pulses normal; no bruit  lower extremity edema: none      Musculoskeletal:  no clubbing of fingers.   normocephalic, head atraumatic  Skin:   warm, dry  Psychiatric:  judgement and insight appropriate  normal mood and affect    RESULTS:     ECG 12 Lead    Date/Time: 3/4/2024 1:45 PM  Performed by: Xander Rivera MD    Authorized by: Xander Rivera MD  Comparison: compared with previous ECG from 10/26/2020  Similar to previous ECG  Rhythm: sinus rhythm  Rate: normal  BPM: 77  QRS axis: normal    Clinical impression: non-specific ECG                Labs:  Lab Results   Component Value Date    TRIG 66 04/13/2023    HDL 60 04/13/2023     (H) 04/13/2023    AST 15 04/13/2023    ALT 14 04/13/2023     Lab Results   Component Value Date    HGBA1C 4.70 (L) 10/26/2020     No components found for: \"CREATINININE\"  eGFR Non  Amer   Date Value Ref Range Status   10/26/2020 111 >60 mL/min/1.73 Final       Most recent PCP note, imaging tests, and labs reviewed.    ASSESSMENT:  Problem List Items Addressed This Visit       DVT (deep venous thrombosis)    Relevant Orders    Adult Transthoracic Echo Complete w/ Color, Spectral and Contrast if necessary per protocol    History of pulmonary embolism - Primary    Relevant Orders    Adult Transthoracic Echo Complete w/ Color, Spectral and " Contrast if necessary per protocol     Other Visit Diagnoses       Enlarged RV (right ventricle)        Relevant Orders    Adult Transthoracic Echo Complete w/ Color, Spectral and Contrast if necessary per protocol            PLAN:    1.  Recurrent DVT/PE:  Agree with lifelong Xarelto 20 mg daily  She has follow-up with hematology soon  IVC filter needs to be removed she is working to find a specialist willing to attempt it as it is deformed and may have penetrated the IVC wall.  If she has trouble getting appointment at  we could talk to Dr. Yost to see if he is willing to take it on.    2.  RV strain:  Repeat echo for surveillance    3.  Fatigue and dyspnea on exertion:  Somewhat expected after sob massive PE  Advised her to increase walking for exercise but not to overexert.      Return to clinic in 6 months, or sooner as needed.    Thank you for the opportunity to share in the care of your patient; please do not hesitate to call me with any questions.     Xander Rivrea MD, [facility], [facility]  Office: [phone] [address] [place] [address]    03/04/24

## 2024-03-07 ENCOUNTER — HOSPITAL ENCOUNTER (OUTPATIENT)
Dept: CARDIOLOGY | Facility: HOSPITAL | Age: 49
Discharge: HOME | End: 2024-03-07
Payer: COMMERCIAL

## 2024-03-07 VITALS
DIASTOLIC BLOOD PRESSURE: 62 MMHG | SYSTOLIC BLOOD PRESSURE: 123 MMHG | WEIGHT: 226 LBS | HEIGHT: 67 IN | BODY MASS INDEX: 35.47 KG/M2

## 2024-03-07 DIAGNOSIS — Z86.711 HISTORY OF PULMONARY EMBOLISM: ICD-10-CM

## 2024-03-07 DIAGNOSIS — I82.402 DEEP VEIN THROMBOSIS (DVT) OF LEFT LOWER EXTREMITY, UNSPECIFIED CHRONICITY, UNSPECIFIED VEIN: ICD-10-CM

## 2024-03-07 DIAGNOSIS — I51.7 ENLARGED RV (RIGHT VENTRICLE): ICD-10-CM

## 2024-03-07 LAB
BH CV ECHO MEAS - AO MAX PG: 7.2 MMHG
BH CV ECHO MEAS - AO MEAN PG: 5 MMHG
BH CV ECHO MEAS - AO ROOT DIAM: 3.1 CM
BH CV ECHO MEAS - AO V2 MAX: 134 CM/SEC
BH CV ECHO MEAS - AO V2 VTI: 30.6 CM
BH CV ECHO MEAS - AVA(I,D): 2.49 CM2
BH CV ECHO MEAS - EDV(CUBED): 91.1 ML
BH CV ECHO MEAS - EDV(MOD-SP2): 115 ML
BH CV ECHO MEAS - EDV(MOD-SP4): 98.6 ML
BH CV ECHO MEAS - EF(MOD-BP): 62 %
BH CV ECHO MEAS - EF(MOD-SP2): 65.1 %
BH CV ECHO MEAS - EF(MOD-SP4): 58.9 %
BH CV ECHO MEAS - ESV(CUBED): 29.8 ML
BH CV ECHO MEAS - ESV(MOD-SP2): 40.1 ML
BH CV ECHO MEAS - ESV(MOD-SP4): 40.5 ML
BH CV ECHO MEAS - FS: 31.1 %
BH CV ECHO MEAS - IVS/LVPW: 1 CM
BH CV ECHO MEAS - IVSD: 0.8 CM
BH CV ECHO MEAS - LA DIMENSION: 3.3 CM
BH CV ECHO MEAS - LAT PEAK E' VEL: 21.4 CM/SEC
BH CV ECHO MEAS - LV MASS(C)D: 113.6 GRAMS
BH CV ECHO MEAS - LV MAX PG: 5.8 MMHG
BH CV ECHO MEAS - LV MEAN PG: 3 MMHG
BH CV ECHO MEAS - LV V1 MAX: 120 CM/SEC
BH CV ECHO MEAS - LV V1 VTI: 24.3 CM
BH CV ECHO MEAS - LVIDD: 4.5 CM
BH CV ECHO MEAS - LVIDS: 3.1 CM
BH CV ECHO MEAS - LVOT AREA: 3.1 CM2
BH CV ECHO MEAS - LVOT DIAM: 2 CM
BH CV ECHO MEAS - LVPWD: 0.8 CM
BH CV ECHO MEAS - MED PEAK E' VEL: 15.3 CM/SEC
BH CV ECHO MEAS - MV A MAX VEL: 68.1 CM/SEC
BH CV ECHO MEAS - MV DEC SLOPE: 879 CM/SEC2
BH CV ECHO MEAS - MV DEC TIME: 0.17 SEC
BH CV ECHO MEAS - MV E MAX VEL: 87 CM/SEC
BH CV ECHO MEAS - MV E/A: 1.28
BH CV ECHO MEAS - MV MAX PG: 3.7 MMHG
BH CV ECHO MEAS - MV MEAN PG: 2 MMHG
BH CV ECHO MEAS - MV P1/2T: 32.7 MSEC
BH CV ECHO MEAS - MV V2 VTI: 19.7 CM
BH CV ECHO MEAS - MVA(P1/2T): 6.7 CM2
BH CV ECHO MEAS - MVA(VTI): 3.9 CM2
BH CV ECHO MEAS - PA ACC TIME: 0.14 SEC
BH CV ECHO MEAS - PA V2 MAX: 105 CM/SEC
BH CV ECHO MEAS - RAP SYSTOLE: 3 MMHG
BH CV ECHO MEAS - RVSP: 26 MMHG
BH CV ECHO MEAS - SV(LVOT): 76.3 ML
BH CV ECHO MEAS - SV(MOD-SP2): 74.9 ML
BH CV ECHO MEAS - SV(MOD-SP4): 58.1 ML
BH CV ECHO MEAS - TAPSE (>1.6): 2.9 CM
BH CV ECHO MEAS - TR MAX PG: 23.2 MMHG
BH CV ECHO MEAS - TR MAX VEL: 241 CM/SEC
BH CV ECHO MEASUREMENTS AVERAGE E/E' RATIO: 4.74
BH CV VAS BP LEFT ARM: NORMAL MMHG
BH CV XLRA - RV BASE: 4.4 CM
BH CV XLRA - RV LENGTH: 7.2 CM
BH CV XLRA - RV MID: 2.6 CM
BH CV XLRA - TDI S': 17.4 CM/SEC
LEFT ATRIUM VOLUME INDEX: 16.6 ML/M2

## 2024-03-07 PROCEDURE — 93306 TTE W/DOPPLER COMPLETE: CPT

## 2024-04-02 ENCOUNTER — TELEPHONE (OUTPATIENT)
Dept: FAMILY MEDICINE CLINIC | Facility: CLINIC | Age: 49
End: 2024-04-02

## 2024-04-02 NOTE — TELEPHONE ENCOUNTER
Caller: Emy Lira    Relationship: Self    Best call back number: [phone]    What is the best time to reach you: ANY    Who are you requesting to speak with (clinical staff, provider,  specific staff member):     CLINICAL    What was the call regarding:     DOES DOCTOR STILL WANT TO SEE PATIENT ON 4/11.  PLEASE CALL TO LET HER KNOW

## 2024-04-04 NOTE — TELEPHONE ENCOUNTER
Pt is going to keep it for now. She is doing okay. She has a scan today, she wasn't sure if you were aware of that or not. Just LOTTIE.

## 2024-04-18 ENCOUNTER — READMISSION MANAGEMENT (OUTPATIENT)
Dept: CALL CENTER | Facility: HOSPITAL | Age: 49
End: 2024-04-18
Payer: COMMERCIAL

## 2024-04-19 ENCOUNTER — TRANSITIONAL CARE MANAGEMENT TELEPHONE ENCOUNTER (OUTPATIENT)
Dept: CALL CENTER | Facility: HOSPITAL | Age: 49
End: 2024-04-19
Payer: COMMERCIAL

## 2024-04-19 NOTE — OUTREACH NOTE
Call Center TCM Note      Flowsheet Row Responses   [facility] patient discharged from? Non-   Does the patient have one of the following disease processes/diagnoses(primary or secondary)? Other   TCM attempt successful? No   Unsuccessful attempts Attempt 1   Call Status Left message            Adrian eMeks RN    4/19/2024, 14:25 EDT

## 2024-04-19 NOTE — OUTREACH NOTE
Prep Survey      Flowsheet Row Responses   [facility] facility patient discharged from? Non-[facility]   Is LACE score < 7 ? Non-BH Discharge   Eligibility [facility]   Date of Admission 04/16/24   Date of Discharge 04/18/24   Discharge Disposition Home or Self Care   Discharge diagnosis Presence of IVC filter   Presence of other vascular implants and grafts   Does the patient have one of the following disease processes/diagnoses(primary or secondary)? Other   Prep survey completed? Yes            BRIE GALLO - Registered Nurse          
Monthly

## 2024-04-19 NOTE — OUTREACH NOTE
Call Center TCM Note      Flowsheet Row Responses   [facility] patient discharged from? Non-   Does the patient have one of the following disease processes/diagnoses(primary or secondary)? Other   TCM attempt successful? Yes   Call start time 1457   Call end time 1500   Discharge diagnosis Presence of IVC filter   Presence of other vascular implants and grafts   Person spoke with today (if not patient) and relationship patient   Meds reviewed with patient/caregiver? Yes   Is the patient having any side effects they believe may be caused by any medication additions or changes? No   Does the patient have all medications ordered at discharge? Yes   Is the patient taking all medications as directed (includes completed medication regime)? Yes   Comments Patient discharged from [email or web address] and has a followup with hemoc.   Does the patient have an appointment with their PCP within 7-14 days of discharge? Other   Nursing Interventions Patient declined scheduling/rescheduling appointment at this time   Psychosocial issues? No   Did the patient receive a copy of their discharge instructions? Yes   Nursing interventions Reviewed instructions with patient   What is the patient's perception of their health status since discharge? Improving   Is the patient/caregiver able to teach back signs and symptoms related to disease process for when to call PCP? Yes   Is the patient/caregiver able to teach back signs and symptoms related to disease process for when to call 911? Yes   Is the patient/caregiver able to teach back the hierarchy of who to call/visit for symptoms/problems? PCP, Specialist, Home health nurse, Urgent Care, ED, 911 Yes   If the patient is a current smoker, are they able to teach back resources for cessation? Not a smoker   TCM call completed? Yes   Wrap up additional comments Declines any needs at this time.   Call end time 1500   Would this patient benefit from a Referral to [facility] Social Work? No   Is the  patient interested in additional calls from an ambulatory ? No            Adrian Meeks RN    4/19/2024, 15:00 EDT

## 2024-04-22 ENCOUNTER — TELEPHONE (OUTPATIENT)
Dept: PAIN MEDICINE | Facility: CLINIC | Age: 49
End: 2024-04-22
Payer: COMMERCIAL

## 2024-04-30 ENCOUNTER — CONSULT (OUTPATIENT)
Dept: ONCOLOGY | Facility: CLINIC | Age: 49
End: 2024-04-30
Payer: COMMERCIAL

## 2024-04-30 VITALS
WEIGHT: 221 LBS | BODY MASS INDEX: 34.69 KG/M2 | RESPIRATION RATE: 18 BRPM | DIASTOLIC BLOOD PRESSURE: 92 MMHG | TEMPERATURE: 98 F | SYSTOLIC BLOOD PRESSURE: 137 MMHG | OXYGEN SATURATION: 98 % | HEART RATE: 82 BPM | HEIGHT: 67 IN

## 2024-04-30 DIAGNOSIS — I82.4Z2 ACUTE DEEP VEIN THROMBOSIS (DVT) OF DISTAL VEIN OF LEFT LOWER EXTREMITY: ICD-10-CM

## 2024-04-30 DIAGNOSIS — Z86.711 HISTORY OF PULMONARY EMBOLISM: Primary | ICD-10-CM

## 2024-04-30 PROCEDURE — 99205 OFFICE O/P NEW HI 60 MIN: CPT | Performed by: INTERNAL MEDICINE

## 2024-04-30 NOTE — LETTER
April 30, 2024       No Recipients    Patient: Emy Lira   YOB: 1975   Date of Visit: 4/30/2024     Dear Anthony Reed MD:       Thank you for referring Emy Lira to me for evaluation. Below are the relevant portions of my assessment and plan of care.    If you have questions, please do not hesitate to call me. I look forward to following Emy along with you.         Sincerely,        Oneil Vigil MD        CC:   No Recipients    Oneil Vigil MD  04/30/24 1137  Sign when Signing Visit  CHIEF COMPLAINT: No somatic complaints    REASON FOR REFERRAL: Recurrent VTE PE DVT      RECORDS OBTAINED  Records of the patients history including those obtained from prior records from Dr. Montiel as well as from  were reviewed and summarized in detail.    Oncology/Hematology History Overview Note   1.  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  No further clotting since back on Xarelto January 2024.      Hematology history timeline:  -2/7/2016 outpatient visit with Dr. Montiel: 40-year-old lady  with an unprovoked DVT of the left leg who presents today for followup after a workup of her blood clot.  I evaluated both acquired and inherited forms of  thrombophilia and so far she does not show any signs of any of those processes, though factor V mutation analysis was not done by [facility] and the lab is being repeated. Regardless, that test would be more of an issue for her family rather than for her. At this time I do recommend that she be on indefinite anticoagulation because I suspect this is likely related to a hyper-estrogen state related to her weight and that risk factor has not changed. She seems to be tolerating her medicine without difficulty at this time. I counseled her regarding the findings of  her testing and if she does have a factor V Leiden mutation, I will notify her of that.  At  this point she can see me on a p.r.n. basis.     -3/4/2019 hematology consult Dr. Montiel: It has been 3 years since I last saw her.  She presents today for consultation due to her history of left lower extremity DVT and the need to discontinue anticoagulation.  She initially presented with unprovoked DVT of her left lower extremity.  At that time she was morbidly obese and I felt that the reason for her blood clot was her morbid obesity.  Which can cause a hyper estrogen state.  Since then she is undergone a gastric sleeve which has resulted in 160 pound weight loss.  At the time due to her insurance refusing to pay for anticoagulation she had an IVC filter placed prior to the procedure.  Unfortunately it was unable to be removed postoperatively.  She continues to tolerate her Eliquis reasonably well.  She has no sequela of her DVT.I spoke to her regarding her risk of discontinuing anticoagulation.  I think she can do it with minimal risk.  She is lost considerable weight and she is no longer morbidly obese.  However having an IVC filter in place creates thrombogenic focus.  I do recommend an aspirin a day to prevent further clotting issues.  Also have recommended that she wear compression stockings during the day.  She is willing to do this.  I think this will at least mitigate the risk of blood clots.  She can continue her current bottle of Eliquis till it is emptied and then can discontinue it.  I will see her on an as-needed basis.    -1/24/2024 [facility] labs: Normal CBC and CMP.  Antithrombin III antigen and activity both normal.    Lupus anticoagulant negative.  Beta-2 glycoprotein 1 negative  Protein C antigen 91% protein C functional normal 92%  Protein S functional 104%.  Protein S total 20%    -3/25/2024 H&P at  indicates patient with past history of pulmonary embolisms, DVT, IVC thrombus, and IVC filter placement.  Her clots began 2010 when she was found to have a PE.  Later found to have a DVT 2016 and  "IVC filter placement 2017.  Attempts were made to remove the filter [facility] 2018 ultimately unsuccessful.  Admitted to [facility] December 2023.  During hospitalization found to have a PE and IVC thrombus.  She underwent IVC venogram and thrombectomy on 12/30/2023 at [facility] and reported significant thrombus above the IVC filter which was removed with penumbra catheter.  She had a saddle embolus at that time with extensive right ventricular strain with RV to LV ratio of 1.8.  Inferior venogram reported \"disc formed \"IVC filter with concern at least 1 leg penetrating through the IVC.  She has been following with [facility] with a clinic visit 3/4/2024 and echocardiogram 3/7/2024 showing normal ejection fraction and mild dilated RV.  Had been receiving Xarelto for anticoagulation which she is tolerating well.  Referred to  IR for complex IVC filter removal.  On Xarelto since January 2024 without any further lower extremity swelling and dyspnea slowly improving.  Saw hematologist in [place] February 2024 but subsequently he left his practice and due for new hematologist April 2024.    -4/16/2024 IVC filter explanted at .    -4/30/2024 [facility] consult: I reviewed the above history with the patient.  Clearly needs lifetime full dose anticoagulation.  Though I am sure she has had prior hypercoagulable workup in part detailed 1/24/2024, this is not a complete workup I have available in the other workup that may have been done and is no longer available to me.  I will check prothrombin gene mutation and factor V Leiden mutation but the only ones that would alter my therapeutic recommendations would be if she had a clearly abnormal anticardiolipin antibody and I will also check a [facility] panel given the tenacity of her clot that now she is doing well on Xarelto since January 2024.     Acute deep vein thrombosis (DVT) of distal vein of left lower extremity       HISTORY OF PRESENT " ILLNESS:  The patient is a 49 y.o.  female, referred for recurrent VTE and saddle pulmonary embolus as recounted in records from  and my partner as above and below.  No recurrent clots since back on Xarelto 2024.  Recent IVC filter extraction at  successful and no clot at that junction.  Partial hypercoagulable workup previously done as outlined above    REVIEW OF SYSTEMS:  No somatic complaints    Past Medical History:   Diagnosis Date   • Arthritis    • Chronic pain disorder    • Cluster headache    • DVT (deep venous thrombosis)    • Gallstones    • Headache    • Headache, tension-type    • Kidney stones    • Low back pain    • Migraine      Past Surgical History:   Procedure Laterality Date   • BARIATRIC SURGERY  2027   • BREAST SURGERY      Biopsy. Unsure date   •  SECTION  2005   • CHOLECYSTECTOMY  2003   • EPIDURAL BLOCK  2002 and 2005   • FOOT SURGERY Left    • GASTRIC SLEEVE LAPAROSCOPIC  2017   • IVC FILTER RETRIEVAL      FIlter is still in place   • KNEE ARTHROSCOPY Left 2010   • ORTHOPEDIC SURGERY     • OTHER SURGICAL HISTORY  2017    IVC Filter (unable to remove) Attempted removal 2018   • PAROTIDECTOMY Right 2017   • TONSILLECTOMY  2014   • TUBAL ABDOMINAL LIGATION  2005       Current Outpatient Medications on File Prior to Visit   Medication Sig Dispense Refill   • Calcium Carbonate-Vit D-Min (CALCIUM 1200 PO) Take 1 tablet by mouth 2 (Two) Times a Day.     • Cholecalciferol (VITAMIN D) 2000 units capsule Take 1 capsule by mouth Daily.     • Multiple Vitamins-Minerals (MULTIVITAL PO) Take 1 tablet by mouth Daily.     • Ubrelvy 100 MG tablet Take 1 tablet by mouth 1 (One) Time As Needed (headache) for up to 1 dose. 10 tablet 11   • Xarelto 20 MG tablet Take 1 tablet by mouth Daily With Dinner. 30 tablet 11     No current facility-administered medications on file prior to visit.       Allergies   Allergen  "Reactions   • Enoxaparin Unknown - Low Severity   • Sulfonylureas Unknown - Low Severity   • Sulfa Antibiotics Rash       Social History     Socioeconomic History   • Marital status:    Tobacco Use   • Smoking status: Never   • Smokeless tobacco: Never   Vaping Use   • Vaping status: Never Used   Substance and Sexual Activity   • Alcohol use: No   • Drug use: No   • Sexual activity: Yes     Partners: Male     Birth control/protection: Surgical       Family History   Problem Relation Age of Onset   • Cancer Mother         Breast   • Arthritis Mother    • Breast cancer Mother    • Hearing loss Mother    • Kidney disease Father    • Hypertension Father    • Cancer Father         Kidney, prostrate   • Arthritis Father    • Heart disease Father    • Kidney cancer Father    • Breast cancer Paternal Aunt    • Throat cancer Paternal Grandfather    • Prostate cancer Paternal Grandfather    • Cancer Paternal Grandfather         Throat       PHYSICAL EXAM:  No jaundice icterus or pallor.  No palpable cords or Homans' sign.  No lower extremity swelling or abdominal swelling or tenderness.  No respiratory distress.    /92   Pulse 82   Temp 98 °F (36.7 °C)   Resp 18   Ht 170.2 cm (67\")   Wt 100 kg (221 lb)   SpO2 98%   BMI 34.61 kg/m²     ECOG score: 0    Lab Results   Component Value Date    HGB 11.1 (L) 04/18/2024    HCT 32.6 (L) 04/18/2024    MCV 93 04/18/2024     04/18/2024    WBC 9.88 04/18/2024    NEUTROABS 3.3 04/13/2023    LYMPHSABS 1.7 04/13/2023    MONOSABS 0.6 04/13/2023    EOSABS 0.1 04/13/2023    BASOSABS 0.0 04/13/2023     Lab Results   Component Value Date    GLUCOSE 91 04/13/2023    BUN 13 04/13/2023    CREATININE 0.57 04/13/2023     04/13/2023    K 5.0 04/13/2023     04/13/2023    CO2 23 04/13/2023    CALCIUM 9.3 04/13/2023    ALBUMIN 4.2 04/13/2023    BILITOT 0.6 04/13/2023    ALKPHOS 124 (H) 04/13/2023    AST 15 04/13/2023    ALT 14 04/13/2023         Assessment & Plan  1. 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  No further clotting since back on Xarelto January 2024.      Hematology history timeline:  -2/7/2016 outpatient visit with Dr. Montiel: 40-year-old lady  with an unprovoked DVT of the left leg who presents today for followup after a workup of her blood clot.  I evaluated both acquired and inherited forms of  thrombophilia and so far she does not show any signs of any of those processes, though factor V mutation analysis was not done by [facility] and the lab is being repeated. Regardless, that test would be more of an issue for her family rather than for her. At this time I do recommend that she be on indefinite anticoagulation because I suspect this is likely related to a hyper-estrogen state related to her weight and that risk factor has not changed. She seems to be tolerating her medicine without difficulty at this time. I counseled her regarding the findings of  her testing and if she does have a factor V Leiden mutation, I will notify her of that.  At this point she can see me on a p.r.n. basis.     -3/4/2019 hematology consult Dr. Montiel: It has been 3 years since I last saw her.  She presents today for consultation due to her history of left lower extremity DVT and the need to discontinue anticoagulation.  She initially presented with unprovoked DVT of her left lower extremity.  At that time she was morbidly obese and I felt that the reason for her blood clot was her morbid obesity.  Which can cause a hyper estrogen state.  Since then she is undergone a gastric sleeve which has resulted in 160 pound weight loss.  At the time due to her insurance refusing to pay for anticoagulation she had an IVC filter placed prior to the procedure.  Unfortunately it was unable to be removed postoperatively.  She continues to tolerate her Eliquis reasonably well.  She has no sequela of  "her DVT.I spoke to her regarding her risk of discontinuing anticoagulation.  I think she can do it with minimal risk.  She is lost considerable weight and she is no longer morbidly obese.  However having an IVC filter in place creates thrombogenic focus.  I do recommend an aspirin a day to prevent further clotting issues.  Also have recommended that she wear compression stockings during the day.  She is willing to do this.  I think this will at least mitigate the risk of blood clots.  She can continue her current bottle of Eliquis till it is emptied and then can discontinue it.  I will see her on an as-needed basis.    -1/24/2024 [facility] labs: Normal CBC and CMP.  Antithrombin III antigen and activity both normal.    Lupus anticoagulant negative.  Beta-2 glycoprotein 1 negative  Protein C antigen 91% protein C functional normal 92%  Protein S functional 104%.  Protein S total 20%    -3/25/2024 H&P at  indicates patient with past history of pulmonary embolisms, DVT, IVC thrombus, and IVC filter placement.  Her clots began 2010 when she was found to have a PE.  Later found to have a DVT 2016 and IVC filter placement 2017.  Attempts were made to remove the filter [facility] 2018 ultimately unsuccessful.  Admitted to [facility] December 2023.  During hospitalization found to have a PE and IVC thrombus.  She underwent IVC venogram and thrombectomy on 12/30/2023 at [facility] and reported significant thrombus above the IVC filter which was removed with penumbra catheter.  She had a saddle embolus at that time with extensive right ventricular strain with RV to LV ratio of 1.8.  Inferior venogram reported \"disc formed \"IVC filter with concern at least 1 leg penetrating through the IVC.  She has been following with [facility] with a clinic visit 3/4/2024 and echocardiogram 3/7/2024 showing normal ejection fraction and mild dilated RV.  Had been receiving Xarelto for anticoagulation which she is " tolerating well.  Referred to  IR for complex IVC filter removal.  On Xarelto since January 2024 without any further lower extremity swelling and dyspnea slowly improving.  Saw hematologist in [place] February 2024 but subsequently he left his practice and due for new hematologist April 2024.    -4/16/2024 IVC filter explanted at .    -4/30/2024 [facility] hematology consult: I reviewed the above history with the patient.  Clearly needs lifetime full dose anticoagulation.  Though I am sure she has had prior hypercoagulable workup in part detailed 1/24/2024, this is not a complete workup I have available in the other workup that may have been done and is no longer available to me.  I will check prothrombin gene mutation and factor V Leiden mutation but the only ones that would alter my therapeutic recommendations would be if she had a clearly abnormal anticardiolipin antibody and I will also check a [facility] panel given the tenacity of her clot that now she is doing well on Xarelto since January 2024.    Total time of care today inclusive of time spent today prior to patient's arrival reviewing past records and cataloging as above and during visit interviewing her as to signs or symptoms of her disease and the management and workup thereof and after visit instituting this plan took 65 minutes of patient care time throughout the day today including review of records from  and Jasper Vigil MD    4/30/2024

## 2024-04-30 NOTE — PROGRESS NOTES
CHIEF COMPLAINT: No somatic complaints    REASON FOR REFERRAL: Recurrent VTE PE DVT      RECORDS OBTAINED  Records of the patients history including those obtained from prior records from Dr. Montiel as well as from  were reviewed and summarized in detail.    Oncology/Hematology History Overview Note   1.  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  No further clotting since back on Xarelto January 2024.      Hematology history timeline:  -2/7/2016 outpatient visit with Dr. Montiel: 40-year-old lady  with an unprovoked DVT of the left leg who presents today for followup after a workup of her blood clot.  I evaluated both acquired and inherited forms of  thrombophilia and so far she does not show any signs of any of those processes, though factor V mutation analysis was not done by [facility] and the lab is being repeated. Regardless, that test would be more of an issue for her family rather than for her. At this time I do recommend that she be on indefinite anticoagulation because I suspect this is likely related to a hyper-estrogen state related to her weight and that risk factor has not changed. She seems to be tolerating her medicine without difficulty at this time. I counseled her regarding the findings of  her testing and if she does have a factor V Leiden mutation, I will notify her of that.  At this point she can see me on a p.r.n. basis.     -3/4/2019 hematology consult Dr. Montiel: It has been 3 years since I last saw her.  She presents today for consultation due to her history of left lower extremity DVT and the need to discontinue anticoagulation.  She initially presented with unprovoked DVT of her left lower extremity.  At that time she was morbidly obese and I felt that the reason for her blood clot was her morbid obesity.  Which can cause a hyper estrogen state.  Since then she is undergone a gastric sleeve  which has resulted in 160 pound weight loss.  At the time due to her insurance refusing to pay for anticoagulation she had an IVC filter placed prior to the procedure.  Unfortunately it was unable to be removed postoperatively.  She continues to tolerate her Eliquis reasonably well.  She has no sequela of her DVT.I spoke to her regarding her risk of discontinuing anticoagulation.  I think she can do it with minimal risk.  She is lost considerable weight and she is no longer morbidly obese.  However having an IVC filter in place creates thrombogenic focus.  I do recommend an aspirin a day to prevent further clotting issues.  Also have recommended that she wear compression stockings during the day.  She is willing to do this.  I think this will at least mitigate the risk of blood clots.  She can continue her current bottle of Eliquis till it is emptied and then can discontinue it.  I will see her on an as-needed basis.    -1/24/2024 [place] labs: Normal CBC and CMP.  Antithrombin III antigen and activity both normal.    Lupus anticoagulant negative.  Beta-2 glycoprotein 1 negative  Protein C antigen 91% protein C functional normal 92%  Protein S functional 104%.  Protein S total 20%    -3/25/2024 H&P at  indicates patient with past history of pulmonary embolisms, DVT, IVC thrombus, and IVC filter placement.  Her clots began 2010 when she was found to have a PE.  Later found to have a DVT 2016 and IVC filter placement 2017.  Attempts were made to remove the filter [facility] 2018 ultimately unsuccessful.  Admitted to [facility] December 2023.  During hospitalization found to have a PE and IVC thrombus.  She underwent IVC venogram and thrombectomy on 12/30/2023 at [facility] and reported significant thrombus above the IVC filter which was removed with penumbra catheter.  She had a saddle embolus at that time with extensive right ventricular strain with RV to LV ratio of 1.8.  Inferior venogram  "reported \"disc formed \"IVC filter with concern at least 1 leg penetrating through the IVC.  She has been following with [facility] with a clinic visit 3/4/2024 and echocardiogram 3/7/2024 showing normal ejection fraction and mild dilated RV.  Had been receiving Xarelto for anticoagulation which she is tolerating well.  Referred to  IR for complex IVC filter removal.  On Xarelto since January 2024 without any further lower extremity swelling and dyspnea slowly improving.  Saw hematologist in [place] February 2024 but subsequently he left his practice and due for new hematologist April 2024.    -4/16/2024 IVC filter explanted at .    -4/30/2024 [facility] consult: I reviewed the above history with the patient.  Clearly needs lifetime full dose anticoagulation.  Though I am sure she has had prior hypercoagulable workup in part detailed 1/24/2024, this is not a complete workup I have available in the other workup that may have been done and is no longer available to me.  I will check prothrombin gene mutation and factor V Leiden mutation but the only ones that would alter my therapeutic recommendations would be if she had a clearly abnormal anticardiolipin antibody and I will also check a [facility] panel given the tenacity of her clot that now she is doing well on Xarelto since January 2024.     Acute deep vein thrombosis (DVT) of distal vein of left lower extremity       HISTORY OF PRESENT ILLNESS:  The patient is a 49 y.o.  female, referred for recurrent VTE and saddle pulmonary embolus as recounted in records from  and my partner as above and below.  No recurrent clots since back on Xarelto January 2024.  Recent IVC filter extraction at  successful and no clot at that junction.  Partial hypercoagulable workup previously done as outlined above    REVIEW OF SYSTEMS:  No somatic complaints    Past Medical History:   Diagnosis Date    Arthritis 2013    Chronic pain disorder 2020    Cluster headache     " DVT (deep venous thrombosis)     Gallstones     Headache     Headache, tension-type     Kidney stones     Low back pain     Migraine      Past Surgical History:   Procedure Laterality Date    BARIATRIC SURGERY  2027    BREAST SURGERY      Biopsy. Unsure date     SECTION  2005    CHOLECYSTECTOMY  2003    EPIDURAL BLOCK  2002 and 2005    FOOT SURGERY Left     GASTRIC SLEEVE LAPAROSCOPIC  2017    IVC FILTER RETRIEVAL      FIlter is still in place    KNEE ARTHROSCOPY Left 2010    ORTHOPEDIC SURGERY      OTHER SURGICAL HISTORY  2017    IVC Filter (unable to remove) Attempted removal 2018    PAROTIDECTOMY Right 2017    TONSILLECTOMY  2014    TUBAL ABDOMINAL LIGATION  2005       Current Outpatient Medications on File Prior to Visit   Medication Sig Dispense Refill    Calcium Carbonate-Vit D-Min (CALCIUM 1200 PO) Take 1 tablet by mouth 2 (Two) Times a Day.      Cholecalciferol (VITAMIN D) 2000 units capsule Take 1 capsule by mouth Daily.      Multiple Vitamins-Minerals (MULTIVITAL PO) Take 1 tablet by mouth Daily.      Ubrelvy 100 MG tablet Take 1 tablet by mouth 1 (One) Time As Needed (headache) for up to 1 dose. 10 tablet 11    Xarelto 20 MG tablet Take 1 tablet by mouth Daily With Dinner. 30 tablet 11     No current facility-administered medications on file prior to visit.       Allergies   Allergen Reactions    Enoxaparin Unknown - Low Severity    Sulfonylureas Unknown - Low Severity    Sulfa Antibiotics Rash       Social History     Socioeconomic History    Marital status:    Tobacco Use    Smoking status: Never    Smokeless tobacco: Never   Vaping Use    Vaping status: Never Used   Substance and Sexual Activity    Alcohol use: No    Drug use: No    Sexual activity: Yes     Partners: Male     Birth control/protection: Surgical       Family History   Problem Relation Age of Onset    Cancer Mother         Breast    Arthritis Mother     Breast  "cancer Mother     Hearing loss Mother     Kidney disease Father     Hypertension Father     Cancer Father         Kidney, prostrate    Arthritis Father     Heart disease Father     Kidney cancer Father     Breast cancer Paternal Aunt     Throat cancer Paternal Grandfather     Prostate cancer Paternal Grandfather     Cancer Paternal Grandfather         Throat       PHYSICAL EXAM:  No jaundice icterus or pallor.  No palpable cords or Homans' sign.  No lower extremity swelling or abdominal swelling or tenderness.  No respiratory distress.    /92   Pulse 82   Temp 98 °F (36.7 °C)   Resp 18   Ht 170.2 cm (67\")   Wt 100 kg (221 lb)   SpO2 98%   BMI 34.61 kg/m²     ECOG score: 0    Lab Results   Component Value Date    HGB 11.1 (L) 04/18/2024    HCT 32.6 (L) 04/18/2024    MCV 93 04/18/2024     04/18/2024    WBC 9.88 04/18/2024    NEUTROABS 3.3 04/13/2023    LYMPHSABS 1.7 04/13/2023    MONOSABS 0.6 04/13/2023    EOSABS 0.1 04/13/2023    BASOSABS 0.0 04/13/2023     Lab Results   Component Value Date    GLUCOSE 91 04/13/2023    BUN 13 04/13/2023    CREATININE 0.57 04/13/2023     04/13/2023    K 5.0 04/13/2023     04/13/2023    CO2 23 04/13/2023    CALCIUM 9.3 04/13/2023    ALBUMIN 4.2 04/13/2023    BILITOT 0.6 04/13/2023    ALKPHOS 124 (H) 04/13/2023    AST 15 04/13/2023    ALT 14 04/13/2023         Assessment & Plan   1.  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  No further clotting since back on Xarelto January 2024.      Hematology history timeline:  -2/7/2016 outpatient visit with Dr. Montiel: 40-year-old lady  with an unprovoked DVT of the left leg who presents today for followup after a workup of her blood clot.  I evaluated both acquired and inherited forms of  thrombophilia and so far she does not show any signs of any of those processes, though factor V mutation analysis was not done " by [facility] and the lab is being repeated. Regardless, that test would be more of an issue for her family rather than for her. At this time I do recommend that she be on indefinite anticoagulation because I suspect this is likely related to a hyper-estrogen state related to her weight and that risk factor has not changed. She seems to be tolerating her medicine without difficulty at this time. I counseled her regarding the findings of  her testing and if she does have a factor V Leiden mutation, I will notify her of that.  At this point she can see me on a p.r.n. basis.     -3/4/2019 hematology consult Dr. Montiel: It has been 3 years since I last saw her.  She presents today for consultation due to her history of left lower extremity DVT and the need to discontinue anticoagulation.  She initially presented with unprovoked DVT of her left lower extremity.  At that time she was morbidly obese and I felt that the reason for her blood clot was her morbid obesity.  Which can cause a hyper estrogen state.  Since then she is undergone a gastric sleeve which has resulted in 160 pound weight loss.  At the time due to her insurance refusing to pay for anticoagulation she had an IVC filter placed prior to the procedure.  Unfortunately it was unable to be removed postoperatively.  She continues to tolerate her Eliquis reasonably well.  She has no sequela of her DVT.I spoke to her regarding her risk of discontinuing anticoagulation.  I think she can do it with minimal risk.  She is lost considerable weight and she is no longer morbidly obese.  However having an IVC filter in place creates thrombogenic focus.  I do recommend an aspirin a day to prevent further clotting issues.  Also have recommended that she wear compression stockings during the day.  She is willing to do this.  I think this will at least mitigate the risk of blood clots.  She can continue her current bottle of Eliquis till it is emptied and then can discontinue it.   "I will see her on an as-needed basis.    -1/24/2024 [place] labs: Normal CBC and CMP.  Antithrombin III antigen and activity both normal.    Lupus anticoagulant negative.  Beta-2 glycoprotein 1 negative  Protein C antigen 91% protein C functional normal 92%  Protein S functional 104%.  Protein S total 20%    -3/25/2024 H&P at  indicates patient with past history of pulmonary embolisms, DVT, IVC thrombus, and IVC filter placement.  Her clots began 2010 when she was found to have a PE.  Later found to have a DVT 2016 and IVC filter placement 2017.  Attempts were made to remove the filter [facility] 2018 ultimately unsuccessful.  Admitted to [facility] December 2023.  During hospitalization found to have a PE and IVC thrombus.  She underwent IVC venogram and thrombectomy on 12/30/2023 at [facility] and reported significant thrombus above the IVC filter which was removed with penumbra catheter.  She had a saddle embolus at that time with extensive right ventricular strain with RV to LV ratio of 1.8.  Inferior venogram reported \"disc formed \"IVC filter with concern at least 1 leg penetrating through the IVC.  She has been following with [facility] with a clinic visit 3/4/2024 and echocardiogram 3/7/2024 showing normal ejection fraction and mild dilated RV.  Had been receiving Xarelto for anticoagulation which she is tolerating well.  Referred to  IR for complex IVC filter removal.  On Xarelto since January 2024 without any further lower extremity swelling and dyspnea slowly improving.  Saw hematologist in [place] February 2024 but subsequently he left his practice and due for new hematologist April 2024.    -4/16/2024 IVC filter explanted at .    -4/30/2024 [facility] consult: I reviewed the above history with the patient.  Clearly needs lifetime full dose anticoagulation.  Though I am sure she has had prior hypercoagulable workup in part detailed 1/24/2024, this is not a complete " workup I have available in the other workup that may have been done and is no longer available to me.  I will check prothrombin gene mutation and factor V Leiden mutation but the only ones that would alter my therapeutic recommendations would be if she had a clearly abnormal anticardiolipin antibody and I will also check a [facility] panel given the tenacity of her clot that now she is doing well on Xarelto since January 2024.    Total time of care today inclusive of time spent today prior to patient's arrival reviewing past records and cataloging as above and during visit interviewing her as to signs or symptoms of her disease and the management and workup thereof and after visit instituting this plan took 65 minutes of patient care time throughout the day today including review of records from  and [facility]      Oneil Vigil MD    4/30/2024

## 2024-04-30 NOTE — LETTER
April 30, 2024       No Recipients    Patient: Emy Lira   YOB: 1975   Date of Visit: 4/30/2024     Dear Anthony Reed MD:       Thank you for referring Emy Lira to me for evaluation. Below are the relevant portions of my assessment and plan of care.    If you have questions, please do not hesitate to call me. I look forward to following Emy along with you.         Sincerely,        Oneil Vigil MD        CC:   No Recipients    Oneil Vigil MD  04/30/24 1137  Incomplete  CHIEF COMPLAINT: No somatic complaints    REASON FOR REFERRAL: Recurrent VTE PE DVT      RECORDS OBTAINED  Records of the patients history including those obtained from prior records from Dr. Montiel as well as from  were reviewed and summarized in detail.    Oncology/Hematology History Overview Note   1.  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  No further clotting since back on Xarelto January 2024.      Hematology history timeline:  -2/7/2016 outpatient visit with Dr. Montiel: 40-year-old lady  with an unprovoked DVT of the left leg who presents today for followup after a workup of her blood clot.  I evaluated both acquired and inherited forms of  thrombophilia and so far she does not show any signs of any of those processes, though factor V mutation analysis was not done by [facility] and the lab is being repeated. Regardless, that test would be more of an issue for her family rather than for her. At this time I do recommend that she be on indefinite anticoagulation because I suspect this is likely related to a hyper-estrogen state related to her weight and that risk factor has not changed. She seems to be tolerating her medicine without difficulty at this time. I counseled her regarding the findings of  her testing and if she does have a factor V Leiden mutation, I will notify her of that.  At this point she  can see me on a p.r.n. basis.     -3/4/2019 hematology consult Dr. Montiel: It has been 3 years since I last saw her.  She presents today for consultation due to her history of left lower extremity DVT and the need to discontinue anticoagulation.  She initially presented with unprovoked DVT of her left lower extremity.  At that time she was morbidly obese and I felt that the reason for her blood clot was her morbid obesity.  Which can cause a hyper estrogen state.  Since then she is undergone a gastric sleeve which has resulted in 160 pound weight loss.  At the time due to her insurance refusing to pay for anticoagulation she had an IVC filter placed prior to the procedure.  Unfortunately it was unable to be removed postoperatively.  She continues to tolerate her Eliquis reasonably well.  She has no sequela of her DVT.I spoke to her regarding her risk of discontinuing anticoagulation.  I think she can do it with minimal risk.  She is lost considerable weight and she is no longer morbidly obese.  However having an IVC filter in place creates thrombogenic focus.  I do recommend an aspirin a day to prevent further clotting issues.  Also have recommended that she wear compression stockings during the day.  She is willing to do this.  I think this will at least mitigate the risk of blood clots.  She can continue her current bottle of Eliquis till it is emptied and then can discontinue it.  I will see her on an as-needed basis.    -1/24/2024 [place] labs: Normal CBC and CMP.  Antithrombin III antigen and activity both normal.    Lupus anticoagulant negative.  Beta-2 glycoprotein 1 negative  Protein C antigen 91% protein C functional normal 92%  Protein S functional 104%.  Protein S total 20%    -3/25/2024 H&P at  indicates patient with past history of pulmonary embolisms, DVT, IVC thrombus, and IVC filter placement.  Her clots began 2010 when she was found to have a PE.  Later found to have a DVT 2016 and IVC filter  "placement 2017.  Attempts were made to remove the filter [facility] 2018 ultimately unsuccessful.  Admitted to [facility] December 2023.  During hospitalization found to have a PE and IVC thrombus.  She underwent IVC venogram and thrombectomy on 12/30/2023 at [facility] and reported significant thrombus above the IVC filter which was removed with penumbra catheter.  She had a saddle embolus at that time with extensive right ventricular strain with RV to LV ratio of 1.8.  Inferior venogram reported \"disc formed \"IVC filter with concern at least 1 leg penetrating through the IVC.  She has been following with [facility] with a clinic visit 3/4/2024 and echocardiogram 3/7/2024 showing normal ejection fraction and mild dilated RV.  Had been receiving Xarelto for anticoagulation which she is tolerating well.  Referred to  IR for complex IVC filter removal.  On Xarelto since January 2024 without any further lower extremity swelling and dyspnea slowly improving.  Saw hematologist in [place] February 2024 but subsequently he left his practice and due for new hematologist April 2024.    -4/16/2024 IVC filter explanted at .    -4/30/2024 [facility] hematology consult: I reviewed the above history with the patient.  Clearly needs lifetime full dose anticoagulation.  Though I am sure she has had prior hypercoagulable workup in part detailed 1/24/2024, this is not a complete workup I have available in the other workup that may have been done and is no longer available to me.  I will check prothrombin gene mutation and factor V Leiden mutation but the only ones that would alter my therapeutic recommendations would be if she had a clearly abnormal anticardiolipin antibody and I will also check a PNH panel given the tenacity of her clot that now she is doing well on Xarelto since January 2024.     Acute deep vein thrombosis (DVT) of distal vein of left lower extremity       HISTORY OF PRESENT ILLNESS:  The " patient is a 49 y.o.  female, referred for recurrent VTE and saddle pulmonary embolus as recounted in records from [facility] and my partner as above and below.  No recurrent clots since back on Xarelto 2024.  Recent IVC filter extraction at [facility] successful and no clot at that junction.  Partial hypercoagulable workup previously done as outlined above    REVIEW OF SYSTEMS:  No somatic complaints    Past Medical History:   Diagnosis Date   • Arthritis    • Chronic pain disorder    • Cluster headache    • DVT (deep venous thrombosis)    • Gallstones    • Headache    • Headache, tension-type    • Kidney stones    • Low back pain    • Migraine      Past Surgical History:   Procedure Laterality Date   • BARIATRIC SURGERY  2027   • BREAST SURGERY      Biopsy. Unsure date   •  SECTION  2005   • CHOLECYSTECTOMY  2003   • EPIDURAL BLOCK  2002 and 2005   • FOOT SURGERY Left    • GASTRIC SLEEVE LAPAROSCOPIC  2017   • IVC FILTER RETRIEVAL  2017    FIlter is still in place   • KNEE ARTHROSCOPY Left 2010   • ORTHOPEDIC SURGERY     • OTHER SURGICAL HISTORY  2017    IVC Filter (unable to remove) Attempted removal 2018   • PAROTIDECTOMY Right 2017   • TONSILLECTOMY  2014   • TUBAL ABDOMINAL LIGATION  2005       Current Outpatient Medications on File Prior to Visit   Medication Sig Dispense Refill   • Calcium Carbonate-Vit D-Min (CALCIUM 1200 PO) Take 1 tablet by mouth 2 (Two) Times a Day.     • Cholecalciferol (VITAMIN D) 2000 units capsule Take 1 capsule by mouth Daily.     • Multiple Vitamins-Minerals (MULTIVITAL PO) Take 1 tablet by mouth Daily.     • Ubrelvy 100 MG tablet Take 1 tablet by mouth 1 (One) Time As Needed (headache) for up to 1 dose. 10 tablet 11   • Xarelto 20 MG tablet Take 1 tablet by mouth Daily With Dinner. 30 tablet 11     No current facility-administered medications on file prior to visit.       Allergies   Allergen Reactions   •  "Enoxaparin Unknown - Low Severity   • Sulfonylureas Unknown - Low Severity   • Sulfa Antibiotics Rash       Social History     Socioeconomic History   • Marital status:    Tobacco Use   • Smoking status: Never   • Smokeless tobacco: Never   Vaping Use   • Vaping status: Never Used   Substance and Sexual Activity   • Alcohol use: No   • Drug use: No   • Sexual activity: Yes     Partners: Male     Birth control/protection: Surgical       Family History   Problem Relation Age of Onset   • Cancer Mother         Breast   • Arthritis Mother    • Breast cancer Mother    • Hearing loss Mother    • Kidney disease Father    • Hypertension Father    • Cancer Father         Kidney, prostrate   • Arthritis Father    • Heart disease Father    • Kidney cancer Father    • Breast cancer Paternal Aunt    • Throat cancer Paternal Grandfather    • Prostate cancer Paternal Grandfather    • Cancer Paternal Grandfather         Throat       PHYSICAL EXAM:  No jaundice icterus or pallor.  No palpable cords or Homans' sign.  No lower extremity swelling or abdominal swelling or tenderness.  No respiratory distress.    /92   Pulse 82   Temp 98 °F (36.7 °C)   Resp 18   Ht 170.2 cm (67\")   Wt 100 kg (221 lb)   SpO2 98%   BMI 34.61 kg/m²     ECOG score: 0    Lab Results   Component Value Date    HGB 11.1 (L) 04/18/2024    HCT 32.6 (L) 04/18/2024    MCV 93 04/18/2024     04/18/2024    WBC 9.88 04/18/2024    NEUTROABS 3.3 04/13/2023    LYMPHSABS 1.7 04/13/2023    MONOSABS 0.6 04/13/2023    EOSABS 0.1 04/13/2023    BASOSABS 0.0 04/13/2023     Lab Results   Component Value Date    GLUCOSE 91 04/13/2023    BUN 13 04/13/2023    CREATININE 0.57 04/13/2023     04/13/2023    K 5.0 04/13/2023     04/13/2023    CO2 23 04/13/2023    CALCIUM 9.3 04/13/2023    ALBUMIN 4.2 04/13/2023    BILITOT 0.6 04/13/2023    ALKPHOS 124 (H) 04/13/2023    AST 15 04/13/2023    ALT 14 04/13/2023         Assessment & Plan  1.  DVT PE with " history of recurrent significant thrombosis including IVC filter thrombosis subsequent thrombectomy and filter removal at  April 2024.  No further clotting since back on Xarelto January 2024.      Hematology history timeline:  -2/7/2016 outpatient visit with Dr. Montiel: 40-year-old lady  with an unprovoked DVT of the left leg who presents today for followup after a workup of her blood clot.  I evaluated both acquired and inherited forms of  thrombophilia and so far she does not show any signs of any of those processes, though factor V mutation analysis was not done by [facility] and the lab is being repeated. Regardless, that test would be more of an issue for her family rather than for her. At this time I do recommend that she be on indefinite anticoagulation because I suspect this is likely related to a hyper-estrogen state related to her weight and that risk factor has not changed. She seems to be tolerating her medicine without difficulty at this time. I counseled her regarding the findings of  her testing and if she does have a factor V Leiden mutation, I will notify her of that.  At this point she can see me on a p.r.n. basis.     -3/4/2019 hematology consult Dr. Montiel: It has been 3 years since I last saw her.  She presents today for consultation due to her history of left lower extremity DVT and the need to discontinue anticoagulation.  She initially presented with unprovoked DVT of her left lower extremity.  At that time she was morbidly obese and I felt that the reason for her blood clot was her morbid obesity.  Which can cause a hyper estrogen state.  Since then she is undergone a gastric sleeve which has resulted in 160 pound weight loss.  At the time due to her insurance refusing to pay for anticoagulation she had an IVC filter placed prior to the procedure.  Unfortunately it was unable to be removed postoperatively.  She continues to tolerate her Eliquis reasonably well.  She has no sequela of her DVT.I spoke  "to her regarding her risk of discontinuing anticoagulation.  I think she can do it with minimal risk.  She is lost considerable weight and she is no longer morbidly obese.  However having an IVC filter in place creates thrombogenic focus.  I do recommend an aspirin a day to prevent further clotting issues.  Also have recommended that she wear compression stockings during the day.  She is willing to do this.  I think this will at least mitigate the risk of blood clots.  She can continue her current bottle of Eliquis till it is emptied and then can discontinue it.  I will see her on an as-needed basis.    -1/24/2024 [place] labs: Normal CBC and CMP.  Antithrombin III antigen and activity both normal.    Lupus anticoagulant negative.  Beta-2 glycoprotein 1 negative  Protein C antigen 91% protein C functional normal 92%  Protein S functional 104%.  Protein S total 20%    -3/25/2024 H&P at  indicates patient with past history of pulmonary embolisms, DVT, IVC thrombus, and IVC filter placement.  Her clots began 2010 when she was found to have a PE.  Later found to have a DVT 2016 and IVC filter placement 2017.  Attempts were made to remove the filter [facility] 2018 ultimately unsuccessful.  Admitted to [facility] December 2023.  During hospitalization found to have a PE and IVC thrombus.  She underwent IVC venogram and thrombectomy on 12/30/2023 at [facility] and reported significant thrombus above the IVC filter which was removed with penumbra catheter.  She had a saddle embolus at that time with extensive right ventricular strain with RV to LV ratio of 1.8.  Inferior venogram reported \"disc formed \"IVC filter with concern at least 1 leg penetrating through the IVC.  She has been following with [facility] cardiology with a clinic visit 3/4/2024 and echocardiogram 3/7/2024 showing normal ejection fraction and mild dilated RV.  Had been receiving Xarelto for anticoagulation which she is tolerating well.  " Referred to  IR for complex IVC filter removal.  On Xarelto since January 2024 without any further lower extremity swelling and dyspnea slowly improving.  Saw hematologist in [place] February 2024 but subsequently he left his practice and due for new hematologist April 2024.    -4/16/2024 IVC filter explanted at .    -4/30/2024 [facility] hematology consult: I reviewed the above history with the patient.  Clearly needs lifetime full dose anticoagulation.  Though I am sure she has had prior hypercoagulable workup in part detailed 1/24/2024, this is not a complete workup I have available in the other workup that may have been done and is no longer available to me.  I will check prothrombin gene mutation and factor V Leiden mutation but the only ones that would alter my therapeutic recommendations would be if she had a clearly abnormal anticardiolipin antibody and I will also check a PNH panel given the tenacity of her clot that now she is doing well on Xarelto since January 2024.    Total time of care today inclusive of time spent today prior to patient's arrival reviewing past records and cataloging as above and during visit interviewing her as to signs or symptoms of her disease and the management and workup thereof and after visit instituting this plan took 65 minutes of patient care time throughout the day today including review of records from  and [place] Meena Vigil MD    4/30/2024    Oneil Vigil MD  04/30/24 1137  Sign when Signing Visit  CHIEF COMPLAINT: No somatic complaints    REASON FOR REFERRAL: Recurrent VTE PE DVT      RECORDS OBTAINED  Records of the patients history including those obtained from prior records from Dr. Montiel as well as from  were reviewed and summarized in detail.    Oncology/Hematology History Overview Note   1.  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  No  further clotting since back on Xarelto January 2024.      Hematology history timeline:  -2/7/2016 outpatient visit with Dr. Montiel: 40-year-old lady  with an unprovoked DVT of the left leg who presents today for followup after a workup of her blood clot.  I evaluated both acquired and inherited forms of  thrombophilia and so far she does not show any signs of any of those processes, though factor V mutation analysis was not done by [facility] and the lab is being repeated. Regardless, that test would be more of an issue for her family rather than for her. At this time I do recommend that she be on indefinite anticoagulation because I suspect this is likely related to a hyper-estrogen state related to her weight and that risk factor has not changed. She seems to be tolerating her medicine without difficulty at this time. I counseled her regarding the findings of  her testing and if she does have a factor V Leiden mutation, I will notify her of that.  At this point she can see me on a p.r.n. basis.     -3/4/2019 hematology consult Dr. Montiel: It has been 3 years since I last saw her.  She presents today for consultation due to her history of left lower extremity DVT and the need to discontinue anticoagulation.  She initially presented with unprovoked DVT of her left lower extremity.  At that time she was morbidly obese and I felt that the reason for her blood clot was her morbid obesity.  Which can cause a hyper estrogen state.  Since then she is undergone a gastric sleeve which has resulted in 160 pound weight loss.  At the time due to her insurance refusing to pay for anticoagulation she had an IVC filter placed prior to the procedure.  Unfortunately it was unable to be removed postoperatively.  She continues to tolerate her Eliquis reasonably well.  She has no sequela of her DVT.I spoke to her regarding her risk of discontinuing anticoagulation.  I think she can do it with minimal risk.  She is lost considerable weight and  "she is no longer morbidly obese.  However having an IVC filter in place creates thrombogenic focus.  I do recommend an aspirin a day to prevent further clotting issues.  Also have recommended that she wear compression stockings during the day.  She is willing to do this.  I think this will at least mitigate the risk of blood clots.  She can continue her current bottle of Eliquis till it is emptied and then can discontinue it.  I will see her on an as-needed basis.    -1/24/2024 [place] labs: Normal CBC and CMP.  Antithrombin III antigen and activity both normal.    Lupus anticoagulant negative.  Beta-2 glycoprotein 1 negative  Protein C antigen 91% protein C functional normal 92%  Protein S functional 104%.  Protein S total 20%    -3/25/2024 H&P at  indicates patient with past history of pulmonary embolisms, DVT, IVC thrombus, and IVC filter placement.  Her clots began 2010 when she was found to have a PE.  Later found to have a DVT 2016 and IVC filter placement 2017.  Attempts were made to remove the filter [facility] 2018 ultimately unsuccessful.  Admitted to [facility] 2023.  During hospitalization found to have a PE and IVC thrombus.  She underwent IVC venogram and thrombectomy on 12/30/2023 at [facility] and reported significant thrombus above the IVC filter which was removed with penumbra catheter.  She had a saddle embolus at that time with extensive right ventricular strain with RV to LV ratio of 1.8.  Inferior venogram reported \"disc formed \"IVC filter with concern at least 1 leg penetrating through the IVC.  She has been following with [facility] cardiology with a clinic visit 3/4/2024 and echocardiogram 3/7/2024 showing normal ejection fraction and mild dilated RV.  Had been receiving Xarelto for anticoagulation which she is tolerating well.  Referred to [facility] IR for complex IVC filter removal.  On Xarelto since January 2024 without any further lower extremity swelling and dyspnea " slowly improving.  Saw hematologist in [place] 2024 but subsequently he left his practice and due for new hematologist 2024.    -2024 IVC filter explanted at .    -2024 [facility] hematology consult: I reviewed the above history with the patient.  Clearly needs lifetime full dose anticoagulation.  Though I am sure she has had prior hypercoagulable workup in part detailed 2024, this is not a complete workup I have available in the other workup that may have been done and is no longer available to me.  I will check prothrombin gene mutation and factor V Leiden mutation but the only ones that would alter my therapeutic recommendations would be if she had a clearly abnormal anticardiolipin antibody and I will also check a PNH panel given the tenacity of her clot that now she is doing well on Xarelto since 2024.     Acute deep vein thrombosis (DVT) of distal vein of left lower extremity       HISTORY OF PRESENT ILLNESS:  The patient is a 49 y.o.  female, referred for recurrent VTE and saddle pulmonary embolus as recounted in records from  and my partner as above and below.  No recurrent clots since back on Xarelto 2024.  Recent IVC filter extraction at  successful and no clot at that junction.  Partial hypercoagulable workup previously done as outlined above    REVIEW OF SYSTEMS:  No somatic complaints    Past Medical History:   Diagnosis Date   • Arthritis    • Chronic pain disorder    • Cluster headache    • DVT (deep venous thrombosis)    • Gallstones    • Headache    • Headache, tension-type    • Kidney stones    • Low back pain    • Migraine      Past Surgical History:   Procedure Laterality Date   • BARIATRIC SURGERY  2027   • BREAST SURGERY      Biopsy. Unsure date   •  SECTION  2005   • CHOLECYSTECTOMY  2003   • EPIDURAL BLOCK  2002 and 2005   • FOOT SURGERY Left    • GASTRIC SLEEVE LAPAROSCOPIC  2017   • IVC FILTER  RETRIEVAL  2017    FIlter is still in place   • KNEE ARTHROSCOPY Left 12/2010   • ORTHOPEDIC SURGERY     • OTHER SURGICAL HISTORY  11/13/2017    IVC Filter (unable to remove) Attempted removal 4/2018   • PAROTIDECTOMY Right 06/26/2017   • TONSILLECTOMY  04/2014   • TUBAL ABDOMINAL LIGATION  6/2005       Current Outpatient Medications on File Prior to Visit   Medication Sig Dispense Refill   • Calcium Carbonate-Vit D-Min (CALCIUM 1200 PO) Take 1 tablet by mouth 2 (Two) Times a Day.     • Cholecalciferol (VITAMIN D) 2000 units capsule Take 1 capsule by mouth Daily.     • Multiple Vitamins-Minerals (MULTIVITAL PO) Take 1 tablet by mouth Daily.     • Ubrelvy 100 MG tablet Take 1 tablet by mouth 1 (One) Time As Needed (headache) for up to 1 dose. 10 tablet 11   • Xarelto 20 MG tablet Take 1 tablet by mouth Daily With Dinner. 30 tablet 11     No current facility-administered medications on file prior to visit.       Allergies   Allergen Reactions   • Enoxaparin Unknown - Low Severity   • Sulfonylureas Unknown - Low Severity   • Sulfa Antibiotics Rash       Social History     Socioeconomic History   • Marital status:    Tobacco Use   • Smoking status: Never   • Smokeless tobacco: Never   Vaping Use   • Vaping status: Never Used   Substance and Sexual Activity   • Alcohol use: No   • Drug use: No   • Sexual activity: Yes     Partners: Male     Birth control/protection: Surgical       Family History   Problem Relation Age of Onset   • Cancer Mother         Breast   • Arthritis Mother    • Breast cancer Mother    • Hearing loss Mother    • Kidney disease Father    • Hypertension Father    • Cancer Father         Kidney, prostrate   • Arthritis Father    • Heart disease Father    • Kidney cancer Father    • Breast cancer Paternal Aunt    • Throat cancer Paternal Grandfather    • Prostate cancer Paternal Grandfather    • Cancer Paternal Grandfather         Throat       PHYSICAL EXAM:  No jaundice icterus or  "pallor.  No palpable cords or Homans' sign.  No lower extremity swelling or abdominal swelling or tenderness.  No respiratory distress.    /92   Pulse 82   Temp 98 °F (36.7 °C)   Resp 18   Ht 170.2 cm (67\")   Wt 100 kg (221 lb)   SpO2 98%   BMI 34.61 kg/m²     ECOG score: 0           ECOG: {findings; ecog performance status:94033}    Lab Results   Component Value Date    HGB 11.1 (L) 04/18/2024    HCT 32.6 (L) 04/18/2024    MCV 93 04/18/2024     04/18/2024    WBC 9.88 04/18/2024    NEUTROABS 3.3 04/13/2023    LYMPHSABS 1.7 04/13/2023    MONOSABS 0.6 04/13/2023    EOSABS 0.1 04/13/2023    BASOSABS 0.0 04/13/2023     Lab Results   Component Value Date    GLUCOSE 91 04/13/2023    BUN 13 04/13/2023    CREATININE 0.57 04/13/2023     04/13/2023    K 5.0 04/13/2023     04/13/2023    CO2 23 04/13/2023    CALCIUM 9.3 04/13/2023    ALBUMIN 4.2 04/13/2023    BILITOT 0.6 04/13/2023    ALKPHOS 124 (H) 04/13/2023    AST 15 04/13/2023    ALT 14 04/13/2023         Assessment & Plan  1.  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  No further clotting since back on Xarelto January 2024.      Hematology history timeline:  -2/7/2016 outpatient visit with Dr. Montiel: 40-year-old lady  with an unprovoked DVT of the left leg who presents today for followup after a workup of her blood clot.  I evaluated both acquired and inherited forms of  thrombophilia and so far she does not show any signs of any of those processes, though factor V mutation analysis was not done by [facility] and the lab is being repeated. Regardless, that test would be more of an issue for her family rather than for her. At this time I do recommend that she be on indefinite anticoagulation because I suspect this is likely related to a hyper-estrogen state related to her weight and that risk factor has not changed. She seems to be tolerating " her medicine without difficulty at this time. I counseled her regarding the findings of  her testing and if she does have a factor V Leiden mutation, I will notify her of that.  At this point she can see me on a p.r.n. basis.     -3/4/2019 hematology consult Dr. Montiel: It has been 3 years since I last saw her.  She presents today for consultation due to her history of left lower extremity DVT and the need to discontinue anticoagulation.  She initially presented with unprovoked DVT of her left lower extremity.  At that time she was morbidly obese and I felt that the reason for her blood clot was her morbid obesity.  Which can cause a hyper estrogen state.  Since then she is undergone a gastric sleeve which has resulted in 160 pound weight loss.  At the time due to her insurance refusing to pay for anticoagulation she had an IVC filter placed prior to the procedure.  Unfortunately it was unable to be removed postoperatively.  She continues to tolerate her Eliquis reasonably well.  She has no sequela of her DVT.I spoke to her regarding her risk of discontinuing anticoagulation.  I think she can do it with minimal risk.  She is lost considerable weight and she is no longer morbidly obese.  However having an IVC filter in place creates thrombogenic focus.  I do recommend an aspirin a day to prevent further clotting issues.  Also have recommended that she wear compression stockings during the day.  She is willing to do this.  I think this will at least mitigate the risk of blood clots.  She can continue her current bottle of Eliquis till it is emptied and then can discontinue it.  I will see her on an as-needed basis.    -1/24/2024 [place] labs: Normal CBC and CMP.  Antithrombin III antigen and activity both normal.    Lupus anticoagulant negative.  Beta-2 glycoprotein 1 negative  Protein C antigen 91% protein C functional normal 92%  Protein S functional 104%.  Protein S total 20%    -3/25/2024 H&P at  indicates patient  "with past history of pulmonary embolisms, DVT, IVC thrombus, and IVC filter placement.  Her clots began 2010 when she was found to have a PE.  Later found to have a DVT 2016 and IVC filter placement 2017.  Attempts were made to remove the filter [facility] 2018 ultimately unsuccessful.  Admitted to [facility] 2023.  During hospitalization found to have a PE and IVC thrombus.  She underwent IVC venogram and thrombectomy on 12/30/2023 at [facility] and reported significant thrombus above the IVC filter which was removed with penumbra catheter.  She had a saddle embolus at that time with extensive right ventricular strain with RV to LV ratio of 1.8.  Inferior venogram reported \"disc formed \"IVC filter with concern at least 1 leg penetrating through the IVC.  She has been following with [facility] with a clinic visit 3/4/2024 and echocardiogram 3/7/2024 showing normal ejection fraction and mild dilated RV.  Had been receiving Xarelto for anticoagulation which she is tolerating well.  Referred to  IR for complex IVC filter removal.  On Xarelto since January 2024 without any further lower extremity swelling and dyspnea slowly improving.  Saw hematologist in [place] February 2024 but subsequently he left his practice and due for new hematologist April 2024.    -4/16/2024 IVC filter explanted at .    -4/30/2024 [facility] consult: I reviewed the above history with the patient.  Clearly needs lifetime full dose anticoagulation.  Though I am sure she has had prior hypercoagulable workup in part detailed 1/24/2024, this is not a complete workup I have available in the other workup that may have been done and is no longer available to me.  I will check prothrombin gene mutation and factor V Leiden mutation but the only ones that would alter my therapeutic recommendations would be if she had a clearly abnormal anticardiolipin antibody and I will also check a [facility] panel given the " tenacity of her clot that now she is doing well on Xarelto since January 2024.    Total time of care today inclusive of time spent today prior to patient's arrival reviewing past records and cataloging as above and during visit interviewing her as to signs or symptoms of her disease and the management and workup thereof and after visit instituting this plan took 65 minutes of patient care time throughout the day today including review of records from  and [facility]      Oneil Vigil MD    4/30/2024

## 2024-05-09 ENCOUNTER — OFFICE VISIT (OUTPATIENT)
Dept: PAIN MEDICINE | Facility: CLINIC | Age: 49
End: 2024-05-09
Payer: COMMERCIAL

## 2024-05-09 ENCOUNTER — LAB (OUTPATIENT)
Dept: LAB | Facility: HOSPITAL | Age: 49
End: 2024-05-09
Payer: COMMERCIAL

## 2024-05-09 VITALS — WEIGHT: 221 LBS | HEIGHT: 67 IN | BODY MASS INDEX: 34.69 KG/M2

## 2024-05-09 DIAGNOSIS — M70.61 TROCHANTERIC BURSITIS OF RIGHT HIP: Primary | ICD-10-CM

## 2024-05-09 DIAGNOSIS — I82.4Z2 ACUTE DEEP VEIN THROMBOSIS (DVT) OF DISTAL VEIN OF LEFT LOWER EXTREMITY: ICD-10-CM

## 2024-05-09 DIAGNOSIS — Z86.711 HISTORY OF PULMONARY EMBOLISM: ICD-10-CM

## 2024-05-09 PROCEDURE — 36415 COLL VENOUS BLD VENIPUNCTURE: CPT

## 2024-05-09 PROCEDURE — 81240 F2 GENE: CPT

## 2024-05-09 PROCEDURE — 81241 F5 GENE: CPT

## 2024-05-09 PROCEDURE — 86147 CARDIOLIPIN ANTIBODY EA IG: CPT

## 2024-05-10 LAB
CARDIOLIPIN IGG SER IA-ACNC: <9 GPL U/ML (ref 0–14)
CARDIOLIPIN IGM SER IA-ACNC: <9 MPL U/ML (ref 0–12)
F5 GENE MUT ANL BLD/T: NORMAL
FACTOR II, DNA ANALYSIS: NORMAL

## 2024-05-13 LAB — PNH RESULT: NORMAL

## 2024-05-21 ENCOUNTER — OUTSIDE FACILITY SERVICE (OUTPATIENT)
Dept: PAIN MEDICINE | Facility: CLINIC | Age: 49
End: 2024-05-21
Payer: COMMERCIAL

## 2024-05-21 ENCOUNTER — DOCUMENTATION (OUTPATIENT)
Dept: PAIN MEDICINE | Facility: CLINIC | Age: 49
End: 2024-05-21

## 2024-05-21 NOTE — PROGRESS NOTES
[facility]  [address] [place] [address]    PROCEDURE: Fluoroscopy guided right Greater trochanteric bursa injection     PRE-OP DIAGNOSIS: Greater trochanteric bursitis    POST-OP DIAGNOSIS: Greater trochanteric bursitis    ANTIPLATELET/ANTICOAGULANT STOP DATE: n/a   ANTIPLATELET/ANTICOAGULANT RESTART DATE: n/a     CONSENT: Risks, benefits and options were explained to the patient, all questions were answered and written informed consent was obtained.     ANESTHESIA: Local only     PROCEDURE NOTE: After timeout was performed, the patient was placed in the supine position with all pressure points padded and the selected side upward. All proceduralists donned sterile gloves with masks and surgical hats. The area of maximal tenderness was identified and marked. The skin was sterilely prepped with chlorhexidine.  Tender greater trochanter was identified under AP fluoroscopy.  Then a 25 gauge 3.5 inch spinal needle was inserted in the lateral thigh and midline and advanced medially under intermittent fluoroscopy until the target greater trochanteric bone was contacted. The needle was slightly withdrawn 1-2 mm and an injectate containing methylprednisolone 40 mg steroid and 4 ml of bupivacaine 0.5% local anesthetic was administered. The needle was then flushed and removed. Pressure was held and a dressing placed.     EBL: None   COMPLICATIONS: None     The patient tolerated the procedure well. Vital signs were stable. Sensory and motor exam was unchanged from baseline. The patient was observed in recovery and was discharged after meeting established criteria.    FOLLOW UP: As scheduled     ADDITIONAL NOTES:     Codes:    Codes:   83624  [id number]

## 2024-05-28 DIAGNOSIS — G43.109 MIGRAINE WITH AURA AND WITHOUT STATUS MIGRAINOSUS, NOT INTRACTABLE: ICD-10-CM

## 2024-05-28 RX ORDER — UBROGEPANT 100 MG/1
TABLET ORAL
Qty: 10 TABLET | Refills: 10 | Status: SHIPPED | OUTPATIENT
Start: 2024-05-28

## 2024-06-04 ENCOUNTER — TELEPHONE (OUTPATIENT)
Dept: GASTROENTEROLOGY | Facility: CLINIC | Age: 49
End: 2024-06-04
Payer: COMMERCIAL

## 2024-06-04 ENCOUNTER — OFFICE VISIT (OUTPATIENT)
Dept: ONCOLOGY | Facility: CLINIC | Age: 49
End: 2024-06-04
Payer: COMMERCIAL

## 2024-06-04 VITALS
OXYGEN SATURATION: 98 % | HEART RATE: 86 BPM | HEIGHT: 67 IN | TEMPERATURE: 97.8 F | RESPIRATION RATE: 18 BRPM | WEIGHT: 225 LBS | DIASTOLIC BLOOD PRESSURE: 88 MMHG | BODY MASS INDEX: 35.31 KG/M2 | SYSTOLIC BLOOD PRESSURE: 123 MMHG

## 2024-06-04 DIAGNOSIS — I82.4Z2 ACUTE DEEP VEIN THROMBOSIS (DVT) OF DISTAL VEIN OF LEFT LOWER EXTREMITY: Primary | ICD-10-CM

## 2024-06-04 DIAGNOSIS — Z12.11 ENCOUNTER FOR SCREENING COLONOSCOPY: ICD-10-CM

## 2024-06-04 NOTE — TELEPHONE ENCOUNTER
Pt having colon in August. Takes blood thinner, Xarelto, prescribed by Dr Vigil, needs cardiac clearance  nAthony Reed MD  [facility] [address], [facility] [address]  Phone: [phone]   Fax: [phone]    You can change your password at any time. 7. Enter your Password Reset Question and Answer. This can be used at a later time if you forget your password. 8. Enter your e-mail address. You will receive e-mail notification when new information is available in [address] 9. Click Sign Up. You can now view and download portions of your medical record. 10. Click the Download Summary menu link to download a portable copy of your medical information. Additional Information    If you have questions, please visit the Frequently Asked Questions section of the [employer] website at [email or web address] Remember, [employer] is NOT to be used for urgent needs. For medical emergencies, dial 911.     Patient armband removed and shredded

## 2024-06-04 NOTE — PROGRESS NOTES
CHIEF COMPLAINT: No new somatic complaints    Problem List:  Oncology/Hematology History Overview Note   1.  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  No further clotting since back on Xarelto January 2024.    Hypercoagulable workup negative.      Hematology history timeline:  -2/7/2016 outpatient visit with Dr. Montiel: 40-year-old lady  with an unprovoked DVT of the left leg who presents today for followup after a workup of her blood clot.  I evaluated both acquired and inherited forms of  thrombophilia and so far she does not show any signs of any of those processes, though factor V mutation analysis was not done by [facility] and the lab is being repeated. Regardless, that test would be more of an issue for her family rather than for her. At this time I do recommend that she be on indefinite anticoagulation because I suspect this is likely related to a hyper-estrogen state related to her weight and that risk factor has not changed. She seems to be tolerating her medicine without difficulty at this time. I counseled her regarding the findings of  her testing and if she does have a factor V Leiden mutation, I will notify her of that.  At this point she can see me on a p.r.n. basis.     -3/4/2019 hematology consult Dr. Montiel: It has been 3 years since I last saw her.  She presents today for consultation due to her history of left lower extremity DVT and the need to discontinue anticoagulation.  She initially presented with unprovoked DVT of her left lower extremity.  At that time she was morbidly obese and I felt that the reason for her blood clot was her morbid obesity.  Which can cause a hyper estrogen state.  Since then she is undergone a gastric sleeve which has resulted in 160 pound weight loss.  At the time due to her insurance refusing to pay for anticoagulation she had an IVC filter placed prior to the  "procedure.  Unfortunately it was unable to be removed postoperatively.  She continues to tolerate her Eliquis reasonably well.  She has no sequela of her DVT.I spoke to her regarding her risk of discontinuing anticoagulation.  I think she can do it with minimal risk.  She is lost considerable weight and she is no longer morbidly obese.  However having an IVC filter in place creates thrombogenic focus.  I do recommend an aspirin a day to prevent further clotting issues.  Also have recommended that she wear compression stockings during the day.  She is willing to do this.  I think this will at least mitigate the risk of blood clots.  She can continue her current bottle of Eliquis till it is emptied and then can discontinue it.  I will see her on an as-needed basis.    -1/24/2024 [facility] labs: Normal CBC and CMP.  Antithrombin III antigen and activity both normal.    Lupus anticoagulant negative.  Beta-2 glycoprotein 1 negative  Protein C antigen 91% protein C functional normal 92%  Protein S functional 104%.  Protein S total 20%    -3/25/2024 H&P at  indicates patient with past history of pulmonary embolisms, DVT, IVC thrombus, and IVC filter placement.  Her clots began 2010 when she was found to have a PE.  Later found to have a DVT 2016 and IVC filter placement 2017.  Attempts were made to remove the filter [facility] 2018 ultimately unsuccessful.  Admitted to [facility] December 2023.  During hospitalization found to have a PE and IVC thrombus.  She underwent IVC venogram and thrombectomy on 12/30/2023 at [facility] and reported significant thrombus above the IVC filter which was removed with penumbra catheter.  She had a saddle embolus at that time with extensive right ventricular strain with RV to LV ratio of 1.8.  Inferior venogram reported \"disc formed \"IVC filter with concern at least 1 leg penetrating through the IVC.  She has been following with [facility] with a clinic visit " 3/4/2024 and echocardiogram 3/7/2024 showing normal ejection fraction and mild dilated RV.  Had been receiving Xarelto for anticoagulation which she is tolerating well.  Referred to  IR for complex IVC filter removal.  On Xarelto since January 2024 without any further lower extremity swelling and dyspnea slowly improving.  Saw hematologist in [place] February 2024 but subsequently he left his practice and due for new hematologist April 2024.    -4/16/2024 IVC filter explanted at .    -4/30/2024 [facility] consult: I reviewed the above history with the patient.  Clearly needs lifetime full dose anticoagulation.  Though I am sure she has had prior hypercoagulable workup in part detailed 1/24/2024, this is not a complete workup I have available in the other workup that may have been done and is no longer available to me.  I will check prothrombin gene mutation and factor V Leiden mutation but the only ones that would alter my therapeutic recommendations would be if she had a clearly abnormal anticardiolipin antibody and I will also check a [facility] panel given the tenacity of her clot that now she is doing well on Xarelto since January 2024.    - 5/9/2024 office note interventional radiology [facility] complex IVC filter removal using forceps and laser successful without immediate complications.  However follow-up on CT angiogram abdomen pelvis next day showed small segmental right renal infarct without other complications.  GI consulted with no clinical evidence of perforation or vascular injury.  No further hematuria flank pain or bleeding and restarted her Xarelto.  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    -5/9/2024  [facility] panel negative.  Prothrombin gene mutation and factor V Leiden mutations both negative.  Anticardiolipin antibody negative    -6/4/2024 [facility] follow-up: Her  hypercoagulable workup is now complete and reviewed and inconsequential.  While one could trace a trail of remediable causes of clot such as her prior morbid obesity now having shed well over 100 pounds as well as the nascent foreign bodies (IVC filters) now removed, given her pulmonary hypertension and chronic dyspnea I would be hesitant to stop anticoagulation as even minor degrees of additional clot burden going to the lungs could be quite debilitating if not ultimately lethal.  She has had no significant hemostatic difficulties since being on anticoagulation since the first of the year.  I would refer her back to Dr. Reed for ongoing provision of her Xarelto.  If she needs interruption of anticoagulation for invasive procedures, cessation 2 or 3 days in advance should be sufficient and I would not use a bridge.  She has not had a colonoscopy and I will get her to gastroenterology for screening as malignancy can be a culprit albeit doubtful to my mind.  Along that same line she is already had mammography and pelvic exams recently.  She does not need formal follow-up with hematologist oncologist unless colonoscopy reveals malignancy.  We remain available should questions arise.  She has follow-up with Dr. Rivera, cardiologist, regarding her pulmonary hypertension.     Acute deep vein thrombosis (DVT) of distal vein of left lower extremity       HISTORY OF PRESENT ILLNESS:  The patient is a 49 y.o. 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  Past Medical History:   Diagnosis Date    Arthritis 2013    Chronic pain disorder 2020    Cluster headache     DVT (deep venous thrombosis)     Gallstones     Headache     Headache, tension-type      "Kidney stones     Low back pain     Migraine      Past Surgical History:   Procedure Laterality Date    BARIATRIC SURGERY  2027    BREAST SURGERY      Biopsy. Unsure date     SECTION  2005    CHOLECYSTECTOMY  2003    EPIDURAL BLOCK  2002 and 2005    FOOT SURGERY Left     GASTRIC SLEEVE LAPAROSCOPIC  2017    IVC FILTER RETRIEVAL  2017    FIlter is still in place    KNEE ARTHROSCOPY Left 2010    ORTHOPEDIC SURGERY  10-    OTHER SURGICAL HISTORY  2017    IVC Filter (unable to remove) Attempted removal 2018    PAROTIDECTOMY Right 2017    TONSILLECTOMY  2014    TUBAL ABDOMINAL LIGATION  2005       Allergies   Allergen Reactions    Enoxaparin Unknown - Low Severity    Sulfonylureas Unknown - Low Severity    Sulfa Antibiotics Rash       Family History and Social History reviewed and changed as necessary    REVIEW OF SYSTEM:   Chronic dyspnea on exertion    PHYSICAL EXAM:  No jaundice icterus or pallor.  No acute respiratory distress and oxygenating well and no abnormal lung sounds    Vitals:    24 0731   BP: 123/88   Pulse: 86   Resp: 18   Temp: 97.8 °F (36.6 °C)   SpO2: 98%   Weight: 102 kg (225 lb)   Height: 170.2 cm (67\")     Vitals:    24 0731   PainSc: 0-No pain          ECOG score: 0           Vitals reviewed.    ECOG: (0) Fully Active - Able to Carry On All Pre-disease Performance Without Restriction    Lab Results   Component Value Date    HGB 11.1 (L) 2024    HCT 32.6 (L) 2024    MCV 93 2024     2024    WBC 9.88 2024    NEUTROABS 3.3 2023    LYMPHSABS 1.7 2023    MONOSABS 0.6 2023    EOSABS 0.1 2023    BASOSABS 0.0 2023       Lab Results   Component Value Date    GLUCOSE 91 2023    BUN 13 2023    CREATININE 0.57 2023     2023    K 5.0 2023     2023    CO2 23 2023    CALCIUM 9.3 2023    ALBUMIN 4.2 2023    " BILITOT 0.6 04/13/2023    ALKPHOS 124 (H) 04/13/2023    AST 15 04/13/2023    ALT 14 04/13/2023             ASSESSMENT & PLAN:  1.  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  No further clotting since back on Xarelto January 2024.    Hypercoagulable workup negative.      Hematology history timeline:  -2/7/2016 outpatient visit with Dr. Montiel: 40-year-old lady  with an unprovoked DVT of the left leg who presents today for followup after a workup of her blood clot.  I evaluated both acquired and inherited forms of  thrombophilia and so far she does not show any signs of any of those processes, though factor V mutation analysis was not done by [facility] and the lab is being repeated. Regardless, that test would be more of an issue for her family rather than for her. At this time I do recommend that she be on indefinite anticoagulation because I suspect this is likely related to a hyper-estrogen state related to her weight and that risk factor has not changed. She seems to be tolerating her medicine without difficulty at this time. I counseled her regarding the findings of  her testing and if she does have a factor V Leiden mutation, I will notify her of that.  At this point she can see me on a p.r.n. basis.     -3/4/2019 hematology consult Dr. Montiel: It has been 3 years since I last saw her.  She presents today for consultation due to her history of left lower extremity DVT and the need to discontinue anticoagulation.  She initially presented with unprovoked DVT of her left lower extremity.  At that time she was morbidly obese and I felt that the reason for her blood clot was her morbid obesity.  Which can cause a hyper estrogen state.  Since then she is undergone a gastric sleeve which has resulted in 160 pound weight loss.  At the time due to her insurance refusing to pay for anticoagulation she had an IVC filter placed  "prior to the procedure.  Unfortunately it was unable to be removed postoperatively.  She continues to tolerate her Eliquis reasonably well.  She has no sequela of her DVT.I spoke to her regarding her risk of discontinuing anticoagulation.  I think she can do it with minimal risk.  She is lost considerable weight and she is no longer morbidly obese.  However having an IVC filter in place creates thrombogenic focus.  I do recommend an aspirin a day to prevent further clotting issues.  Also have recommended that she wear compression stockings during the day.  She is willing to do this.  I think this will at least mitigate the risk of blood clots.  She can continue her current bottle of Eliquis till it is emptied and then can discontinue it.  I will see her on an as-needed basis.    -1/24/2024 [facility] labs: Normal CBC and CMP.  Antithrombin III antigen and activity both normal.    Lupus anticoagulant negative.  Beta-2 glycoprotein 1 negative  Protein C antigen 91% protein C functional normal 92%  Protein S functional 104%.  Protein S total 20%    -3/25/2024 H&P at  indicates patient with past history of pulmonary embolisms, DVT, IVC thrombus, and IVC filter placement.  Her clots began 2010 when she was found to have a PE.  Later found to have a DVT 2016 and IVC filter placement 2017.  Attempts were made to remove the filter [facility] 2018 ultimately unsuccessful.  Admitted to [facility] December 2023.  During hospitalization found to have a PE and IVC thrombus.  She underwent IVC venogram and thrombectomy on 12/30/2023 at [facility] and reported significant thrombus above the IVC filter which was removed with penumbra catheter.  She had a saddle embolus at that time with extensive right ventricular strain with RV to LV ratio of 1.8.  Inferior venogram reported \"disc formed \"IVC filter with concern at least 1 leg penetrating through the IVC.  She has been following with [facility] with a " clinic visit 3/4/2024 and echocardiogram 3/7/2024 showing normal ejection fraction and mild dilated RV.  Had been receiving Xarelto for anticoagulation which she is tolerating well.  Referred to  IR for complex IVC filter removal.  On Xarelto since January 2024 without any further lower extremity swelling and dyspnea slowly improving.  Saw hematologist in [place] February 2024 but subsequently he left his practice and due for new hematologist April 2024.    -4/16/2024 IVC filter explanted at .    -4/30/2024 [facility] consult: I reviewed the above history with the patient.  Clearly needs lifetime full dose anticoagulation.  Though I am sure she has had prior hypercoagulable workup in part detailed 1/24/2024, this is not a complete workup I have available in the other workup that may have been done and is no longer available to me.  I will check prothrombin gene mutation and factor V Leiden mutation but the only ones that would alter my therapeutic recommendations would be if she had a clearly abnormal anticardiolipin antibody and I will also check a [facility] panel given the tenacity of her clot that now she is doing well on Xarelto since January 2024.    - 5/9/2024 office note interventional radiology [facility] complex IVC filter removal using forceps and laser successful without immediate complications.  However follow-up on CT angiogram abdomen pelvis next day showed small segmental right renal infarct without other complications.  GI consulted with no clinical evidence of perforation or vascular injury.  No further hematuria flank pain or bleeding and restarted her Xarelto.  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    -5/9/2024  [facility] panel negative.  Prothrombin gene mutation and factor V Leiden mutations both negative.  Anticardiolipin antibody negative    -6/4/2024 [facility] follow-up:  Her hypercoagulable workup is now complete and reviewed and inconsequential.  While one could trace a trail of remediable causes of clot such as her prior morbid obesity now having shed well over 100 pounds as well as the nascent foreign bodies (IVC filters) now removed, given her pulmonary hypertension and chronic dyspnea I would be hesitant to stop anticoagulation as even minor degrees of additional clot burden going to the lungs could be quite debilitating if not ultimately lethal.  She has had no significant hemostatic difficulties since being on anticoagulation since the first of the year.  I would refer her back to Dr. Reed for ongoing provision of her Xarelto.  If she needs interruption of anticoagulation for invasive procedures, cessation 2 or 3 days in advance should be sufficient and I would not use a bridge.  She has not had a colonoscopy and I will get her to gastroenterology for screening as malignancy can be a culprit albeit doubtful to my mind.  Along that same line she is already had mammography and pelvic exams recently.  She does not need formal follow-up with hematologist oncologist unless colonoscopy reveals malignancy.  We remain available should questions arise.  She has follow-up with Dr. Rivera, cardiologist, regarding her pulmonary hypertension.    Total time of care today inclusive of time spent today prior to patient's arrival reviewing interval records from [facility] and interval data and during visit translating that patient putting forth a plan as outlined above and after visit instituting this plan took 35 minutes patient care time throughout the day today.  Oneil Vigil MD    06/04/2024

## 2024-06-04 NOTE — LETTER
June 4, 2024       No Recipients    Patient: Emy Lira   YOB: 1975   Date of Visit: 6/4/2024     Dear Anthony Reed MD:       Thank you for referring Emy Lira to me for evaluation. Below are the relevant portions of my assessment and plan of care.    If you have questions, please do not hesitate to call me. I look forward to following Emy along with you.         Sincerely,        Oneil Vigil MD        CC:   No Recipients    Oneil Vigil MD  06/04/24 0756  Sign when Signing Visit  CHIEF COMPLAINT: No new somatic complaints    Problem List:  Oncology/Hematology History Overview Note   1.  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  No further clotting since back on Xarelto January 2024.    Hypercoagulable workup negative.      Hematology history timeline:  -2/7/2016 outpatient visit with Dr. Montiel: 40-year-old lady  with an unprovoked DVT of the left leg who presents today for followup after a workup of her blood clot.  I evaluated both acquired and inherited forms of  thrombophilia and so far she does not show any signs of any of those processes, though factor V mutation analysis was not done by [facility] and the lab is being repeated. Regardless, that test would be more of an issue for her family rather than for her. At this time I do recommend that she be on indefinite anticoagulation because I suspect this is likely related to a hyper-estrogen state related to her weight and that risk factor has not changed. She seems to be tolerating her medicine without difficulty at this time. I counseled her regarding the findings of  her testing and if she does have a factor V Leiden mutation, I will notify her of that.  At this point she can see me on a p.r.n. basis.     -3/4/2019 hematology consult Dr. Montiel: It has been 3 years since I last saw her.  She presents today for consultation  due to her history of left lower extremity DVT and the need to discontinue anticoagulation.  She initially presented with unprovoked DVT of her left lower extremity.  At that time she was morbidly obese and I felt that the reason for her blood clot was her morbid obesity.  Which can cause a hyper estrogen state.  Since then she is undergone a gastric sleeve which has resulted in 160 pound weight loss.  At the time due to her insurance refusing to pay for anticoagulation she had an IVC filter placed prior to the procedure.  Unfortunately it was unable to be removed postoperatively.  She continues to tolerate her Eliquis reasonably well.  She has no sequela of her DVT.I spoke to her regarding her risk of discontinuing anticoagulation.  I think she can do it with minimal risk.  She is lost considerable weight and she is no longer morbidly obese.  However having an IVC filter in place creates thrombogenic focus.  I do recommend an aspirin a day to prevent further clotting issues.  Also have recommended that she wear compression stockings during the day.  She is willing to do this.  I think this will at least mitigate the risk of blood clots.  She can continue her current bottle of Eliquis till it is emptied and then can discontinue it.  I will see her on an as-needed basis.    -1/24/2024 [place] labs: Normal CBC and CMP.  Antithrombin III antigen and activity both normal.    Lupus anticoagulant negative.  Beta-2 glycoprotein 1 negative  Protein C antigen 91% protein C functional normal 92%  Protein S functional 104%.  Protein S total 20%    -3/25/2024 H&P at  indicates patient with past history of pulmonary embolisms, DVT, IVC thrombus, and IVC filter placement.  Her clots began 2010 when she was found to have a PE.  Later found to have a DVT 2016 and IVC filter placement 2017.  Attempts were made to remove the filter [facility] 2018 ultimately unsuccessful.  Admitted [facility] 2023.  During  "hospitalization found to have a PE and IVC thrombus.  She underwent IVC venogram and thrombectomy on 12/30/2023 at [facility] and reported significant thrombus above the IVC filter which was removed with penumbra catheter.  She had a saddle embolus at that time with extensive right ventricular strain with RV to LV ratio of 1.8.  Inferior venogram reported \"disc formed \"IVC filter with concern at least 1 leg penetrating through the IVC.  She has been following with [facility] with a clinic visit 3/4/2024 and echocardiogram 3/7/2024 showing normal ejection fraction and mild dilated RV.  Had been receiving Xarelto for anticoagulation which she is tolerating well.  Referred to  IR for complex IVC filter removal.  On Xarelto since January 2024 without any further lower extremity swelling and dyspnea slowly improving.  Saw hematologist in [place] February 2024 but subsequently he left his practice and due for new hematologist April 2024.    -4/16/2024 IVC filter explanted at .    -4/30/2024 [facility] consult: I reviewed the above history with the patient.  Clearly needs lifetime full dose anticoagulation.  Though I am sure she has had prior hypercoagulable workup in part detailed 1/24/2024, this is not a complete workup I have available in the other workup that may have been done and is no longer available to me.  I will check prothrombin gene mutation and factor V Leiden mutation but the only ones that would alter my therapeutic recommendations would be if she had a clearly abnormal anticardiolipin antibody and I will also check a [facility] panel given the tenacity of her clot that now she is doing well on Xarelto since January 2024.    - 5/9/2024 office note interventional radiology [facility] complex IVC filter removal using forceps and laser successful without immediate complications.  However follow-up on CT angiogram abdomen pelvis next day showed small segmental right renal " infarct without other complications.  GI consulted with no clinical evidence of perforation or vascular injury.  No further hematuria flank pain or bleeding and restarted her Xarelto.  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    -5/9/2024  [facility] panel negative.  Prothrombin gene mutation and factor V Leiden mutations both negative.  Anticardiolipin antibody negative    -6/4/2024 [facility] follow-up: Her hypercoagulable workup is now complete and reviewed and inconsequential.  While one could trace a trail of remediable causes of clot such as her prior morbid obesity now having shed well over 100 pounds as well as the nascent foreign bodies (IVC filters) now removed, given her pulmonary hypertension and chronic dyspnea I would be hesitant to stop anticoagulation as even minor degrees of additional clot burden going to the lungs could be quite debilitating if not ultimately lethal.  She has had no significant hemostatic difficulties since being on anticoagulation since the first of the year.  I would refer her back to Dr. Reed for ongoing provision of her Xarelto.  If she needs interruption of anticoagulation for invasive procedures, cessation 2 or 3 days in advance should be sufficient and I would not use a bridge.  She has not had a colonoscopy and I will get her to gastroenterology for screening as malignancy can be a culprit albeit doubtful to my mind.  Along that same line she is already had mammography and pelvic exams recently.  She does not need formal follow-up with hematologist oncologist unless colonoscopy reveals malignancy.  We remain available should questions arise.  She has follow-up with Dr. Rivera, cardiologist, regarding her pulmonary hypertension.     Acute deep vein thrombosis (DVT) of distal vein of left lower extremity       HISTORY OF PRESENT ILLNESS:  The patient is a 49 y.o. 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  Past Medical History:   Diagnosis Date   • Arthritis    • Chronic pain disorder    • Cluster headache    • DVT (deep venous thrombosis)    • Gallstones    • Headache    • Headache, tension-type    • Kidney stones    • Low back pain    • Migraine      Past Surgical History:   Procedure Laterality Date   • BARIATRIC SURGERY  2027   • BREAST SURGERY      Biopsy. Unsure date   •  SECTION  2005   • CHOLECYSTECTOMY  2003   • EPIDURAL BLOCK  2002 and 2005   • FOOT SURGERY Left    • GASTRIC SLEEVE LAPAROSCOPIC  2017   • IVC FILTER RETRIEVAL  2017    FIlter is still in place   • KNEE ARTHROSCOPY Left 2010   • ORTHOPEDIC SURGERY     • OTHER SURGICAL HISTORY  2017    IVC Filter (unable to remove) Attempted removal 2018   • PAROTIDECTOMY Right 2017   • TONSILLECTOMY  2014   • TUBAL ABDOMINAL LIGATION  2005       Allergies   Allergen Reactions   • Enoxaparin Unknown - Low Severity   • Sulfonylureas Unknown - Low Severity   • Sulfa Antibiotics Rash       Family History and Social History reviewed and changed as necessary    REVIEW OF SYSTEM:   Chronic dyspnea on exertion    PHYSICAL EXAM:  No jaundice icterus or pallor.  No acute respiratory distress and oxygenating well and no abnormal lung sounds    Vitals:    24 0731   BP: 123/88   Pulse: 86   Resp: 18   Temp: 97.8 °F (36.6 °C)   SpO2: 98%   Weight: 102 kg (225 lb)   Height: 170.2 cm (67\")     Vitals:    24 0731   PainSc: 0-No pain          ECOG score: 0           Vitals reviewed.    ECOG: (0) Fully Active - Able to Carry On All Pre-disease Performance Without Restriction    Lab Results "   Component Value Date    HGB 11.1 (L) 04/18/2024    HCT 32.6 (L) 04/18/2024    MCV 93 04/18/2024     04/18/2024    WBC 9.88 04/18/2024    NEUTROABS 3.3 04/13/2023    LYMPHSABS 1.7 04/13/2023    MONOSABS 0.6 04/13/2023    EOSABS 0.1 04/13/2023    BASOSABS 0.0 04/13/2023       Lab Results   Component Value Date    GLUCOSE 91 04/13/2023    BUN 13 04/13/2023    CREATININE 0.57 04/13/2023     04/13/2023    K 5.0 04/13/2023     04/13/2023    CO2 23 04/13/2023    CALCIUM 9.3 04/13/2023    ALBUMIN 4.2 04/13/2023    BILITOT 0.6 04/13/2023    ALKPHOS 124 (H) 04/13/2023    AST 15 04/13/2023    ALT 14 04/13/2023             ASSESSMENT & PLAN:  1.  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  No further clotting since back on Xarelto January 2024.    Hypercoagulable workup negative.      Hematology history timeline:  -2/7/2016 outpatient visit with Dr. Montiel: 40-year-old lady  with an unprovoked DVT of the left leg who presents today for followup after a workup of her blood clot.  I evaluated both acquired and inherited forms of  thrombophilia and so far she does not show any signs of any of those processes, though factor V mutation analysis was not done by [facility] and the lab is being repeated. Regardless, that test would be more of an issue for her family rather than for her. At this time I do recommend that she be on indefinite anticoagulation because I suspect this is likely related to a hyper-estrogen state related to her weight and that risk factor has not changed. She seems to be tolerating her medicine without difficulty at this time. I counseled her regarding the findings of  her testing and if she does have a factor V Leiden mutation, I will notify her of that.  At this point she can see me on a p.r.n. basis.     -3/4/2019 hematology consult Dr. Montiel: It has been 3 years since I last saw her.  She  presents today for consultation due to her history of left lower extremity DVT and the need to discontinue anticoagulation.  She initially presented with unprovoked DVT of her left lower extremity.  At that time she was morbidly obese and I felt that the reason for her blood clot was her morbid obesity.  Which can cause a hyper estrogen state.  Since then she is undergone a gastric sleeve which has resulted in 160 pound weight loss.  At the time due to her insurance refusing to pay for anticoagulation she had an IVC filter placed prior to the procedure.  Unfortunately it was unable to be removed postoperatively.  She continues to tolerate her Eliquis reasonably well.  She has no sequela of her DVT.I spoke to her regarding her risk of discontinuing anticoagulation.  I think she can do it with minimal risk.  She is lost considerable weight and she is no longer morbidly obese.  However having an IVC filter in place creates thrombogenic focus.  I do recommend an aspirin a day to prevent further clotting issues.  Also have recommended that she wear compression stockings during the day.  She is willing to do this.  I think this will at least mitigate the risk of blood clots.  She can continue her current bottle of Eliquis till it is emptied and then can discontinue it.  I will see her on an as-needed basis.    -1/24/2024 [place] labs: Normal CBC and CMP.  Antithrombin III antigen and activity both normal.    Lupus anticoagulant negative.  Beta-2 glycoprotein 1 negative  Protein C antigen 91% protein C functional normal 92%  Protein S functional 104%.  Protein S total 20%    -3/25/2024 H&P at  indicates patient with past history of pulmonary embolisms, DVT, IVC thrombus, and IVC filter placement.  Her clots began 2010 when she was found to have a PE.  Later found to have a DVT 2016 and IVC filter placement 2017.  Attempts were made to remove the filter [facility] 2018 ultimately unsuccessful.  Admitted to [facility] December 2023.  During hospitalization found to have a PE and IVC thrombus.  She underwent IVC venogram and thrombectomy on 12/30/2023 at [facility] and reported significant thrombus above the IVC filter which was removed with penumbra catheter.  She had a saddle embolus at that time with extensive right ventricular strain with RV to LV ratio of 1.8.  Inferior venogram reported \"disc formed \"IVC filter with concern at least 1 leg penetrating through the IVC.  She has been following with [facility] with a clinic visit 3/4/2024 and echocardiogram 3/7/2024 showing normal ejection fraction and mild dilated RV.  Had been receiving Xarelto for anticoagulation which she is tolerating well.  Referred to  IR for complex IVC filter removal.  On Xarelto since January 2024 without any further lower extremity swelling and dyspnea slowly improving.  Saw hematologist in [place] February 2024 but subsequently he left his practice and due for new hematologist April 2024.    -4/16/2024 IVC filter explanted at .    -4/30/2024 [facility] consult: I reviewed the above history with the patient.  Clearly needs lifetime full dose anticoagulation.  Though I am sure she has had prior hypercoagulable workup in part detailed 1/24/2024, this is not a complete workup I have available in the other workup that may have been done and is no longer available to me.  I will check prothrombin gene mutation and factor V Leiden mutation but the only ones that would alter my therapeutic recommendations would be if she had a clearly abnormal anticardiolipin antibody and I will also check a [facility] panel given the tenacity of her clot that now she is doing well on Xarelto since January 2024.    - 5/9/2024 office note interventional radiology [facility] complex IVC filter removal using forceps and laser successful without immediate complications.  However follow-up on CT angiogram abdomen pelvis next day showed " small segmental right renal infarct without other complications.  GI consulted with no clinical evidence of perforation or vascular injury.  No further hematuria flank pain or bleeding and restarted her Xarelto.  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    -5/9/2024  PNH panel negative.  Prothrombin gene mutation and factor V Leiden mutations both negative.  Anticardiolipin antibody negative    -6/4/2024 [facility] follow-up: Her hypercoagulable workup is now complete and reviewed and inconsequential.  While one could trace a trail of remediable causes of clot such as her prior morbid obesity now having shed well over 100 pounds as well as the nascent foreign bodies (IVC filters) now removed, given her pulmonary hypertension and chronic dyspnea I would be hesitant to stop anticoagulation as even minor degrees of additional clot burden going to the lungs could be quite debilitating if not ultimately lethal.  She has had no significant hemostatic difficulties since being on anticoagulation since the first of the year.  I would refer her back to Dr. Reed for ongoing provision of her Xarelto.  If she needs interruption of anticoagulation for invasive procedures, cessation 2 or 3 days in advance should be sufficient and I would not use a bridge.  She has not had a colonoscopy and I will get her to gastroenterology for screening as malignancy can be a culprit albeit doubtful to my mind.  Along that same line she is already had mammography and pelvic exams recently.  She does not need formal follow-up with hematologist oncologist unless colonoscopy reveals malignancy.  We remain available should questions arise.  She has follow-up with Dr. Rivera, cardiologist, regarding her pulmonary hypertension.    Total time of care today inclusive of time spent today prior to patient's arrival reviewing interval records from   and interval data and during visit translating that patient putting forth a plan as outlined above and after visit instituting this plan took 35 minutes patient care time throughout the day today.  Oneil Vigil MD    06/04/2024

## 2024-06-06 ENCOUNTER — OFFICE VISIT (OUTPATIENT)
Dept: FAMILY MEDICINE CLINIC | Facility: CLINIC | Age: 49
End: 2024-06-06
Payer: COMMERCIAL

## 2024-06-06 VITALS
SYSTOLIC BLOOD PRESSURE: 140 MMHG | WEIGHT: 224.3 LBS | HEIGHT: 67 IN | DIASTOLIC BLOOD PRESSURE: 94 MMHG | OXYGEN SATURATION: 99 % | BODY MASS INDEX: 35.2 KG/M2 | HEART RATE: 74 BPM

## 2024-06-06 DIAGNOSIS — Z95.828 PRESENCE OF IVC FILTER: ICD-10-CM

## 2024-06-06 DIAGNOSIS — N28.0 RENAL INFARCT: Primary | ICD-10-CM

## 2024-06-06 DIAGNOSIS — Z86.711 HISTORY OF PULMONARY EMBOLISM: ICD-10-CM

## 2024-06-06 PROCEDURE — 99214 OFFICE O/P EST MOD 30 MIN: CPT | Performed by: FAMILY MEDICINE

## 2024-06-06 RX ORDER — RIVAROXABAN 20 MG/1
20 TABLET, FILM COATED ORAL
Qty: 90 TABLET | Refills: 3 | Status: SHIPPED | OUTPATIENT
Start: 2024-06-06

## 2024-06-06 NOTE — PROGRESS NOTES
Follow Up Office Visit      Date of Visit:  2024   Patient Name: Emy Lira  : 1975   MRN: [id number]     Chief Complaint:    Chief Complaint   Patient presents with    Hospital Follow Up Visit       History of Present Illness: Emy Lira is a 49 y.o. female who is here today for follow up.  Patient seen today for hospital follow-up.  She recently had her IVC filter removed.  Very complicated procedure.  IVC filter was initially not in place of this while she had the saddle embolism.  Has also seen hematology.  Suggested to stay on Xarelto lifetime.  May stop with procedures for 2 to 3 days.        Subjective      Review of Systems:   Review of Systems   Constitutional:  Negative for fatigue and fever.   HENT:  Negative for congestion and ear pain.    Respiratory:  Negative for apnea, cough, chest tightness and shortness of breath.    Cardiovascular:  Negative for chest pain.   Gastrointestinal:  Negative for abdominal pain, constipation, diarrhea and nausea.   Musculoskeletal:  Negative for arthralgias.   Psychiatric/Behavioral:  Negative for depressed mood and stress.        Past Medical History:   Past Medical History:   Diagnosis Date    Arthritis     Chronic pain disorder     Cluster headache     DVT (deep venous thrombosis)     Gallstones     Headache     Headache, tension-type     Kidney stones     Low back pain     Migraine        Past Surgical History:   Past Surgical History:   Procedure Laterality Date    BARIATRIC SURGERY  2027    BREAST SURGERY      Biopsy. Unsure date     SECTION  2005    CHOLECYSTECTOMY  2003    EPIDURAL BLOCK  2002 and 2005    FOOT SURGERY Left     GASTRIC SLEEVE LAPAROSCOPIC  2017    IVC FILTER RETRIEVAL  2017    FIlter is still in place    KNEE ARTHROSCOPY Left 2010    ORTHOPEDIC SURGERY      OTHER SURGICAL HISTORY  2017    IVC Filter (unable to remove) Attempted removal 2018     "PAROTIDECTOMY Right 06/26/2017    TONSILLECTOMY  04/2014    TUBAL ABDOMINAL LIGATION  6/2005       Family History:   Family History   Problem Relation Age of Onset    Cancer Mother         Breast    Arthritis Mother     Breast cancer Mother     Hearing loss Mother     Kidney disease Father     Hypertension Father     Cancer Father         Kidney, prostrate    Arthritis Father     Heart disease Father     Kidney cancer Father     Breast cancer Paternal Aunt     Throat cancer Paternal Grandfather     Prostate cancer Paternal Grandfather     Cancer Paternal Grandfather         Throat       Social History:   Social History     Socioeconomic History    Marital status:    Tobacco Use    Smoking status: Never    Smokeless tobacco: Never   Vaping Use    Vaping status: Never Used   Substance and Sexual Activity    Alcohol use: No    Drug use: No    Sexual activity: Yes     Partners: Male     Birth control/protection: Surgical       Medications:     Current Outpatient Medications:     Xarelto 20 MG tablet, Take 1 tablet by mouth Daily With Dinner., Disp: 90 tablet, Rfl: 3    Calcium Carbonate-Vit D-Min (CALCIUM 1200 PO), Take 1 tablet by mouth 2 (Two) Times a Day., Disp: , Rfl:     Cholecalciferol (VITAMIN D) 2000 units capsule, Take 1 capsule by mouth Daily., Disp: , Rfl:     Multiple Vitamins-Minerals (MULTIVITAL PO), Take 1 tablet by mouth Daily., Disp: , Rfl:     Ubrelvy 100 MG tablet, TAKE 1 TABLET BY MOUTH ONE TIME AS NEEDED FOR HEADACHE FOR UP TO 1 DOSE, Disp: 10 tablet, Rfl: 10    Allergies:   Allergies   Allergen Reactions    Enoxaparin Unknown - Low Severity    Sulfonylureas Unknown - Low Severity    Sulfa Antibiotics Rash       Objective     Physical Exam:  Vital Signs:   Vitals:    06/06/24 0806   BP: 140/94   Pulse: 74   SpO2: 99%   Weight: 102 kg (224 lb 4.8 oz)   Height: 170.2 cm (67\")     Body mass index is 35.13 kg/m².     Physical Exam  Vitals and nursing note reviewed.   Constitutional:       " General: She is not in acute distress.     Appearance: Normal appearance. She is not ill-appearing.   HENT:      Head: Normocephalic and atraumatic.      Right Ear: Tympanic membrane and ear canal normal.      Left Ear: Tympanic membrane and ear canal normal.      Nose: Nose normal.   Cardiovascular:      Rate and Rhythm: Normal rate and regular rhythm.      Heart sounds: Normal heart sounds.   Pulmonary:      Effort: Pulmonary effort is normal.      Breath sounds: Normal breath sounds.   Neurological:      Mental Status: She is alert and oriented to person, place, and time. Mental status is at baseline.   Psychiatric:         Mood and Affect: Mood normal.         Procedures      Assessment / Plan      Assessment/Plan:   Diagnoses and all orders for this visit:    1. Renal infarct (Primary)  -     Comprehensive Metabolic Panel    2. History of pulmonary embolism    3. Presence of IVC filter    Other orders  -     Xarelto 20 MG tablet; Take 1 tablet by mouth Daily With Dinner.  Dispense: 90 tablet; Refill: 3         Patient will continue Xarelto.  I am reordering a CMP because of the small renal infarct due to the procedure.  I also want to recheck her liver enzymes were elevated after the procedure.  Overall doing well.    Follow Up:   No follow-ups on file.    Anthony Reed  [facility]

## 2024-06-07 LAB
ALBUMIN SERPL-MCNC: 4 G/DL (ref 3.9–4.9)
ALBUMIN/GLOB SERPL: 1.8 {RATIO} (ref 1.2–2.2)
ALP SERPL-CCNC: 139 IU/L (ref 44–121)
ALT SERPL-CCNC: 11 IU/L (ref 0–32)
AST SERPL-CCNC: 15 IU/L (ref 0–40)
BILIRUB SERPL-MCNC: 0.4 MG/DL (ref 0–1.2)
BUN SERPL-MCNC: 18 MG/DL (ref 6–24)
BUN/CREAT SERPL: 27 (ref 9–23)
CALCIUM SERPL-MCNC: 9.2 MG/DL (ref 8.7–10.2)
CHLORIDE SERPL-SCNC: 105 MMOL/L (ref 96–106)
CO2 SERPL-SCNC: 22 MMOL/L (ref 20–29)
CREAT SERPL-MCNC: 0.66 MG/DL (ref 0.57–1)
EGFRCR SERPLBLD CKD-EPI 2021: 109 ML/MIN/1.73
GLOBULIN SER CALC-MCNC: 2.2 G/DL (ref 1.5–4.5)
GLUCOSE SERPL-MCNC: 90 MG/DL (ref 70–99)
POTASSIUM SERPL-SCNC: 4.8 MMOL/L (ref 3.5–5.2)
PROT SERPL-MCNC: 6.2 G/DL (ref 6–8.5)
SODIUM SERPL-SCNC: 140 MMOL/L (ref 134–144)

## 2024-07-22 ENCOUNTER — OFFICE VISIT (OUTPATIENT)
Dept: PAIN MEDICINE | Facility: CLINIC | Age: 49
End: 2024-07-22
Payer: COMMERCIAL

## 2024-07-22 VITALS — HEIGHT: 67 IN | BODY MASS INDEX: 35.12 KG/M2

## 2024-07-22 DIAGNOSIS — R20.2 NUMBNESS AND TINGLING OF RIGHT LEG: ICD-10-CM

## 2024-07-22 DIAGNOSIS — R20.0 NUMBNESS AND TINGLING OF RIGHT LEG: ICD-10-CM

## 2024-07-22 DIAGNOSIS — M70.61 TROCHANTERIC BURSITIS OF RIGHT HIP: Primary | ICD-10-CM

## 2024-07-22 PROCEDURE — 99213 OFFICE O/P EST LOW 20 MIN: CPT

## 2024-07-22 NOTE — PROGRESS NOTES
Referring Physician: No referring provider defined for this encounter.    Primary Physician: Anthony Reed MD    CHIEF COMPLAINT or REASON FOR VISIT: Follow-up (post right GTB/Patient reports 0 relief) and Bursitis      Initial history of present illness on 12/20/2023:  Ms. Emy Lira is 49 y.o. female who presents as a new patient referral for evaluation treatment of chronic right-sided lateral thigh pain.  Pain is ongoing for greater than 6 months.  She did have a gastric sleeve in 2017, lost significant weight and subsequently developed increasing right-sided lateral thigh/hip pain without any inciting event or trauma.  She has tried acetaminophen, NSAIDs, physical therapy, heat therapy without significant benefit.  She does attend chiropractic.  She has difficulty laying on her right side without causing an achy tingly pain on the lateral aspect of her thigh.  She has never had any injection.  She has tried a Medrol Dosepak without benefit.  Patient denies any bowel or bladder dysfunction, lower extremity weakness, new onset saddle anesthesia or unexplained weight loss.   She was evaluated by neurosurgery, Tk Aguero PA-C, who referred for pain management.  She does have a lumbar MRI.    Interval history:   Patient returns to clinic today after undergoing right GTB steroid injection.  Unfortunately, this did not provide her with much relief.  She continues to complain of chronic right-sided lateral thigh pain that radiates down the entire length of her right leg.  Pain is really only present whenever she is laying on her right side.  This brings on a sensation of numbness/tingling that goes all the way down her right leg into her foot and all of her toes.  This subsides whenever she changes positions.  She has previously undergone an lumbar MRI which revealed moderate bilateral NFS at L5/S1.  We discussed a lumbar epidural steroid injection at today's appointment however patient is hesitant to  undergo any procedures like that at this time.  She denies any back pain.    Interventions:  2024: Right GTB with 0% relief    Objective Pain Scoring:   BRIEF PAIN INVENTORY:  Total score:   Pain Score    24 0804   PainSc:   1   PainLoc: Hip          PHQ-2: PHQ-2 Total Score: 0  PHQ-9: PHQ-9: Brief Depression Severity Measure Score: 0  Opioid Risk Tool:         Review of Systems:   ROS negative except as otherwise noted     Past Medical History:   Past Medical History:   Diagnosis Date    Arthritis     Chronic pain disorder     Cluster headache     DVT (deep venous thrombosis)     Gallstones     Headache     Headache, tension-type     Kidney stones     Low back pain     Migraine          Past Surgical History:   Past Surgical History:   Procedure Laterality Date    BARIATRIC SURGERY  2027    BREAST SURGERY      Biopsy. Unsure date     SECTION  2005    CHOLECYSTECTOMY  2003    EPIDURAL BLOCK  2002 and 2005    FOOT SURGERY Left     GASTRIC SLEEVE LAPAROSCOPIC  2017    IVC FILTER RETRIEVAL      FIlter is still in place    KNEE ARTHROSCOPY Left 2010    ORTHOPEDIC SURGERY      OTHER SURGICAL HISTORY  2017    IVC Filter (unable to remove) Attempted removal 2018    PAROTIDECTOMY Right 2017    TONSILLECTOMY  2014    TUBAL ABDOMINAL LIGATION  2005         Family History   Family History   Problem Relation Age of Onset    Cancer Mother         Breast    Arthritis Mother     Breast cancer Mother     Hearing loss Mother     Kidney disease Father     Hypertension Father     Cancer Father         Kidney, prostrate    Arthritis Father     Heart disease Father     Kidney cancer Father     Breast cancer Paternal Aunt     Throat cancer Paternal Grandfather     Prostate cancer Paternal Grandfather     Cancer Paternal Grandfather         Throat         Social History   Social History     Socioeconomic History    Marital status:    Tobacco  "Use    Smoking status: Never    Smokeless tobacco: Never   Vaping Use    Vaping status: Never Used   Substance and Sexual Activity    Alcohol use: No    Drug use: No    Sexual activity: Yes     Partners: Male     Birth control/protection: Surgical        Medications:     Current Outpatient Medications:     Calcium Carbonate-Vit D-Min (CALCIUM 1200 PO), Take 1 tablet by mouth 2 (Two) Times a Day., Disp: , Rfl:     Cholecalciferol (VITAMIN D) 2000 units capsule, Take 1 capsule by mouth Daily., Disp: , Rfl:     Multiple Vitamins-Minerals (MULTIVITAL PO), Take 1 tablet by mouth Daily., Disp: , Rfl:     Ubrelvy 100 MG tablet, TAKE 1 TABLET BY MOUTH ONE TIME AS NEEDED FOR HEADACHE FOR UP TO 1 DOSE, Disp: 10 tablet, Rfl: 10    Xarelto 20 MG tablet, Take 1 tablet by mouth Daily With Dinner., Disp: 90 tablet, Rfl: 3        Physical Exam:     Vitals:    07/22/24 0804   Height: 170.2 cm (67.01\")   PainSc:   1   PainLoc: Hip            General: Alert and oriented, No acute distress.   HEENT: Normocephalic, atraumatic.   Cardiovascular: No gross edema  Respiratory: Respirations are non-labored    Lumbar Spine:   No masses or atrophy  Range of motion - Flexion normal. Extension normal.    Facet Loading: Negative bilaterally  Facet Palpation - Nontender   TTP right greater trochanter    Motor Exam:    Strength: Rate on 1-5 scale Right Left    C5-Elbow flexion, Deltoid 5 5    C6-Wrist extension 5 5    C7- Elbow / finger extension 5 5    C8- Finger flexion 5 5    T1- Intrinsics hand 5 5    Strength: Rate on 1-5 scale Right Left    L1/2- hip flexion 5 5    L3- knee extension 5 5    L4- ankle dorsiflexion 5 5    L5- great toe extension 5 5    S1- ankle plantarflexion 5 5    Sensory Exam: Full and equal sensation to light touch throughout.    Neurologic: Cranial Nerves II-XII are grossly intact.   Psychiatric: Cooperative.   Gait: Normal   Assistive Devices: None      Imaging Studies:   Results for orders placed during the hospital " encounter of 05/27/23    MRI Lumbar Spine Without Contrast    Narrative  MRI LUMBAR SPINE WO CONTRAST    Date of Exam: 5/27/2023 1:41 PM EDT    Indication: cont. sciatica and hip pain.    Comparison: None available.    Technique:  Routine multiplanar/multisequence sequence images of the lumbar spine were obtained without contrast administration.    Findings:  T1 marrow signal is preserved, without evidence of fracture or suspicious marrow replacing lesion. Alignment is anatomic, without evidence of significant listhesis or subluxation. The conus medullaris and cauda equina nerve roots are satisfactory in  appearance. The paraspinal soft tissues demonstrate no acute or suspicious findings. Multilevel spondylosis is present, with areas of involvement including    L1-2, no significant spinal canal or neuroforaminal impingement.    L2-3, no significant spinal canal or neuroforaminal impingement.    L3-4, no significant spinal canal or neuroforaminal impingement..    L4-5, ligamentum flavum thickening and small disc bulge, with bilateral facet arthropathy. The spinal canal is patent. There is mild bilateral neuroforaminal narrowing present, greater on the left.    L5-S1, small disc bulge, bilateral facet arthropathy and mild ligamentum flavum thickening. The spinal canal is patent. There is moderate to severe left and moderate right neuroforaminal narrowing.    Impression  Impression:  Multilevel lumbar spondylosis is noted, somewhat focally notable at the L5-S1 level where disc bulge and facet arthropathy result in moderate to severe narrowing of the left neural foramen. The spinal canal is patent throughout.    Electronically Signed: Kendell Cifuentes  5/27/2023 9:16 PM EDT  Workstation ID: [id number]      Impression & Plan:       12/20/2023: Emy Lira is a 49 y.o. female with past medical history significant for gastric sleeve surgery 2017, who presents to the pain clinic for evaluation and treatment of chronic right  lateral thigh pain.  I personally reviewed and interpreted her lumbar MRI dated May 27, 2023 demonstrating: L5/S1 degenerative disc disease without Modic changes; L4/5 mild DDD; moderate bilateral L5/S1 NFS; patent canal.  Examination consistent with right trochanteric bursitis, possible meralgia paresthetica.  We discussed trochanteric steroid injection to improve pain.  If greater than 50% relief for at least 2-3 months can consider repeat as needed every 3 to 4 months.  I had a discussion with the patient regarding the risks of the procedure including bleeding, infection, damage to surrounding structures.  We discussed the potential adverse effects of corticosteroid injection including flushing of the face, lipodystrophy, skin discoloration, elevated blood glucose, increased blood pressure.  Risks of frequent steroid administration include weight gain, hormonal changes, mood changes, osteoporosis.  2024: Will plan for right GTB as previously scheduled.  2024: Minimal relief from GTB.  Discussed LESI, patient politely declined.  Will obtain EMG/NCV of BLE to investigate right leg numbness and tingling.    1. Trochanteric bursitis of right hip    2. Numbness and tingling of right leg            PLAN:  1. Medication Recommendations: Recommend Voltaren topical, NSAIDs, Tylenol.  Can trial turmeric 500 mg twice daily if NSAID contraindicated.    2. Physical Therapy: Continue HEP.  Patient advised to continue stretching and weight loss to prevent recurrence    3. Psychological: defer    4. Complementary and alternative (CAM) Therapies:     5. Labs/Diagnostic studies: Obtain EMGs/NCV of BLE with Dr. Gonzalez to investigate right leg numbness and tingling  6. Imagin. Interventions: Can consider right paramedian L5/S1 LESI.  Would need blood thinner clearance to hold Xarelto    8. Referrals: None indicated     9. Records:     10. Lifestyle goals: Continue weight loss    Follow-up 2 months.  Can reschedule  follow-up appointment to an earlier date if EMG is completed before 2 months.         [facility]  Ivon Hernandez PA-C

## 2024-09-23 ENCOUNTER — OFFICE VISIT (OUTPATIENT)
Dept: PAIN MEDICINE | Facility: CLINIC | Age: 49
End: 2024-09-23
Payer: COMMERCIAL

## 2024-09-23 VITALS — BODY MASS INDEX: 35.47 KG/M2 | WEIGHT: 226 LBS | HEIGHT: 67 IN

## 2024-09-23 DIAGNOSIS — R20.2 NUMBNESS AND TINGLING OF RIGHT LEG: ICD-10-CM

## 2024-09-23 DIAGNOSIS — R20.0 NUMBNESS AND TINGLING OF RIGHT LEG: ICD-10-CM

## 2024-09-23 DIAGNOSIS — M70.61 TROCHANTERIC BURSITIS OF RIGHT HIP: Primary | ICD-10-CM

## 2024-09-23 PROCEDURE — 99213 OFFICE O/P EST LOW 20 MIN: CPT

## 2024-09-26 RX ORDER — SODIUM, POTASSIUM,MAG SULFATES 17.5-3.13G
2 SOLUTION, RECONSTITUTED, ORAL ORAL EVERY 12 HOURS
Qty: 354 ML | Refills: 0 | Status: SHIPPED | OUTPATIENT
Start: 2024-09-26

## 2024-10-02 ENCOUNTER — TELEPHONE (OUTPATIENT)
Dept: GASTROENTEROLOGY | Facility: CLINIC | Age: 49
End: 2024-10-02
Payer: COMMERCIAL

## 2024-10-02 NOTE — TELEPHONE ENCOUNTER
PATIENT HAS QUESTIONS ABOUT CARDIAC CLEARANCE AND I DON'T WANT TO TELL HER WRONG. CAN SOMEONE (MA) PLEASE CALL HER. THANK YOU. [phone]

## 2024-10-02 NOTE — TELEPHONE ENCOUNTER
Called patient and explained she needs to hold blood thinner 48 hours prior to procedure. Patient understood.

## 2024-10-09 ENCOUNTER — OFFICE VISIT (OUTPATIENT)
Dept: CARDIOLOGY | Facility: CLINIC | Age: 49
End: 2024-10-09
Payer: COMMERCIAL

## 2024-10-09 VITALS
BODY MASS INDEX: 35.56 KG/M2 | OXYGEN SATURATION: 98 % | SYSTOLIC BLOOD PRESSURE: 124 MMHG | HEIGHT: 67 IN | DIASTOLIC BLOOD PRESSURE: 82 MMHG | HEART RATE: 59 BPM | WEIGHT: 226.6 LBS

## 2024-10-09 DIAGNOSIS — Z79.01 CHRONIC ANTICOAGULATION: ICD-10-CM

## 2024-10-09 DIAGNOSIS — E66.9 OBESITY (BMI 35.0-39.9 WITHOUT COMORBIDITY): ICD-10-CM

## 2024-10-09 DIAGNOSIS — Z86.711 HISTORY OF PULMONARY EMBOLISM: Primary | ICD-10-CM

## 2024-10-09 NOTE — PROGRESS NOTES
OFFICE VISIT  NOTE  [facility] CARDIOLOGY      Name: mEy Lira    Date: 10/9/2024  MRN:  [id number]  :  1975      REFERRING/PRIMARY PROVIDER:  Anthony Reed MD    Chief Complaint   Patient presents with    History of pulmonary embolism       HPI: Emy Lira is a 49 y.o. female who presents for history of pulmonary embolus 2023.  Echo ([facility]) at that time showed normal EF with normal RV size and function, with IVC thrombus.  History of PE in , and DVT in  she had an IVC filter placed in  they attempted to remove it in 2018 in [place] but were unsuccessful, recent IVC venogram and thrombectomy 2023 at [facility] reported significant thrombus above the IVC filter which was removed with penumbra catheter, she had a saddle embolus at that time with extensive RV strain within RV to LV ratio of 1.8.  Inferior venogram reported disc formed IVC filter with at least 1 leg penetrating through the IVC, status post removal at  2024, overall she did well after the procedure.  Echo 3/7/2024 showed normal EF 62%, mild RV dilatation and normal RVSP 26 mmHg.  Still some shortness of breath but overall improved.  She saw Dr. Vigil with hematology, he recommends lifelong anticoagulation which I agree with.  He also recommended she stop the anticoagulation 2 to 3 days before invasive procedures and no bridging was necessary.  She is currently off Xarelto for 48 hours for colonoscopy scheduled for tomorrow.    Past Medical History:   Diagnosis Date    Arthritis 2013    Chronic pain disorder     Cluster headache     DVT (deep venous thrombosis)     Gallstones     Headache     Headache, tension-type     Kidney stones     Low back pain     Migraine        Past Surgical History:   Procedure Laterality Date    BARIATRIC SURGERY  2027    BREAST SURGERY      Biopsy. Unsure date     SECTION  2005    CHOLECYSTECTOMY  2003     EPIDURAL BLOCK  9/28/2002 and 6/7/2005    FOOT SURGERY Left 2020    GASTRIC SLEEVE LAPAROSCOPIC  11/13/2017    IVC FILTER RETRIEVAL  2017    FIlter is still in place    KNEE ARTHROSCOPY Left 12/2010    ORTHOPEDIC SURGERY      OTHER SURGICAL HISTORY  11/13/2017    IVC Filter (unable to remove) Attempted removal 4/2018    PAROTIDECTOMY Right 06/26/2017    TONSILLECTOMY  04/2014    TUBAL ABDOMINAL LIGATION  6/2005       Social History     Socioeconomic History    Marital status:    Tobacco Use    Smoking status: Never    Smokeless tobacco: Never   Vaping Use    Vaping status: Never Used   Substance and Sexual Activity    Alcohol use: No    Drug use: No    Sexual activity: Yes     Partners: Male     Birth control/protection: Surgical       Family History   Problem Relation Age of Onset    Cancer Mother         Breast    Arthritis Mother     Breast cancer Mother     Hearing loss Mother     Kidney disease Father     Hypertension Father     Cancer Father         Kidney, prostrate    Arthritis Father     Heart disease Father     Kidney cancer Father     Breast cancer Paternal Aunt     Throat cancer Paternal Grandfather     Prostate cancer Paternal Grandfather     Cancer Paternal Grandfather         Throat        ROS:   Constitutional no fever,  no weight loss   Skin no rash, no subcutaneous nodules   Otolaryngeal no difficulty swallowing   Cardiovascular See HPI   Pulmonary no cough, no sputum production   Gastrointestinal no constipation, no diarrhea   Genitourinary no dysuria, no hematuria   Hematologic no easy bruisability, no abnormal bleeding   Musculoskeletal no muscle pain   Neurologic no dizziness, no falls         Allergies   Allergen Reactions    Enoxaparin Unknown - Low Severity    Sulfonylureas Unknown - Low Severity    Sulfa Antibiotics Rash         Current Outpatient Medications:     Calcium Carbonate-Vit D-Min (CALCIUM 1200 PO), Take 1 tablet by mouth 2 (Two) Times a Day., Disp: , Rfl:      "Cholecalciferol (VITAMIN D) 2000 units capsule, Take 1 capsule by mouth Daily., Disp: , Rfl:     Multiple Vitamins-Minerals (MULTIVITAL PO), Take 1 tablet by mouth Daily., Disp: , Rfl:     sodium-potassium-magnesium sulfates (Suprep Bowel Prep Kit) 17.5-3.13-1.6 GM/177ML solution oral solution, Take 2 bottles by mouth Every 12 (Twelve) Hours., Disp: 354 mL, Rfl: 0    Ubrelvy 100 MG tablet, TAKE 1 TABLET BY MOUTH ONE TIME AS NEEDED FOR HEADACHE FOR UP TO 1 DOSE, Disp: 10 tablet, Rfl: 10    Xarelto 20 MG tablet, Take 1 tablet by mouth Daily With Dinner., Disp: 90 tablet, Rfl: 3    Vitals:    10/09/24 0915 10/09/24 0928   BP: 146/100 124/82   Pulse: 59    SpO2: 98%    Weight: 103 kg (226 lb 9.6 oz)    Height: 170.2 cm (67\")      Body mass index is 35.49 kg/m².    PHYSICAL EXAM:    General Appearance:   well developed  well nourished  HENT:   oropharynx moist  lips not cyanotic  Neck:  thyroid not enlarged  supple  Respiratory:  no respiratory distress  normal breath sounds  no rales  Cardiovascular:  no jugular venous distention  regular rhythm  apical impulse normal  S1 normal, S2 normal  no S3, no S4   no murmur  no rub, no thrill  carotid pulses normal; no bruit  lower extremity edema: none      Musculoskeletal:  no clubbing of fingers.   normocephalic, head atraumatic  Skin:   warm, dry  Psychiatric:  judgement and insight appropriate  normal mood and affect    RESULTS:   Procedures    Results for orders placed during the hospital encounter of 03/07/24    Adult Transthoracic Echo Complete w/ Color, Spectral and Contrast if necessary per protocol    Interpretation Summary    Left ventricular systolic function is normal. Calculated left ventricular EF = 62% Left ventricular ejection fraction appears to be 61 - 65%.    Left ventricular diastolic function was normal.    The right ventricular cavity is mildly dilated.    Estimated right ventricular systolic pressure from tricuspid regurgitation is normal (<35 mmHg). " "Calculated right ventricular systolic pressure from tricuspid regurgitation is 26 mmHg.        Labs:  Lab Results   Component Value Date    TRIG 66 04/13/2023    HDL 60 04/13/2023     (H) 04/13/2023    AST 15 06/06/2024    ALT 11 06/06/2024     Lab Results   Component Value Date    HGBA1C 4.70 (L) 10/26/2020     No components found for: \"CREATINININE\"  eGFR Non  Amer   Date Value Ref Range Status   10/26/2020 111 >60 mL/min/1.73 Final       Most recent PCP note, imaging tests, and labs reviewed.    ASSESSMENT:  Problem List Items Addressed This Visit       History of pulmonary embolism - Primary    Obesity (BMI 35.0-39.9 without comorbidity)    Chronic anticoagulation       PLAN:    1.  Recurrent DVT/PE:  Agree with lifelong Xarelto 20 mg daily, lifelong  Okay to hold temporarily 48 to 72 hours prior to invasive procedures without bridging heparin, per hematology recommendations  Status post IVC removal at  4/2024    2.  RV strain after saddle PE:  Repeat echo 3/2024 showed normal EF with mild RV dilatation and normal RVSP which is reassuring.    3.  Fatigue and dyspnea on exertion:  Improving, expected after PE.  Although echo shows normal RVSP which is good.  Increase exercise and work on weight loss.    4.  Obesity BMI 35.49:  Discussed importance of weight loss, she is working on it show no increase aerobic exercise and decrease caloric intake      Return to clinic in 12 months, or sooner as needed.    Thank you for the opportunity to share in the care of your patient; please do not hesitate to call me with any questions.     Xander Rivera MD, [facility], [facility]  Office: [phone]  [address] [place] [address]    10/09/24    "

## 2024-10-10 ENCOUNTER — OUTSIDE FACILITY SERVICE (OUTPATIENT)
Dept: GASTROENTEROLOGY | Facility: CLINIC | Age: 49
End: 2024-10-10
Payer: COMMERCIAL

## 2024-10-10 PROCEDURE — 88305 TISSUE EXAM BY PATHOLOGIST: CPT | Performed by: INTERNAL MEDICINE

## 2024-10-10 PROCEDURE — 45380 COLONOSCOPY AND BIOPSY: CPT | Performed by: INTERNAL MEDICINE

## 2024-10-11 ENCOUNTER — LAB REQUISITION (OUTPATIENT)
Dept: LAB | Facility: HOSPITAL | Age: 49
End: 2024-10-11
Payer: COMMERCIAL

## 2024-10-11 DIAGNOSIS — Z83.719 FAMILY HISTORY OF COLON POLYPS, UNSPECIFIED: ICD-10-CM

## 2024-10-11 DIAGNOSIS — Z12.11 ENCOUNTER FOR SCREENING FOR MALIGNANT NEOPLASM OF COLON: ICD-10-CM

## 2024-10-11 DIAGNOSIS — D12.5 BENIGN NEOPLASM OF SIGMOID COLON: ICD-10-CM

## 2024-10-14 LAB — REF LAB TEST METHOD: NORMAL

## 2024-12-19 ENCOUNTER — TELEPHONE (OUTPATIENT)
Dept: FAMILY MEDICINE CLINIC | Facility: CLINIC | Age: 49
End: 2024-12-19
Payer: COMMERCIAL

## 2025-02-17 ENCOUNTER — OFFICE VISIT (OUTPATIENT)
Dept: FAMILY MEDICINE CLINIC | Facility: CLINIC | Age: 50
End: 2025-02-17
Payer: COMMERCIAL

## 2025-02-17 VITALS
HEART RATE: 84 BPM | HEIGHT: 67 IN | BODY MASS INDEX: 35.94 KG/M2 | OXYGEN SATURATION: 98 % | DIASTOLIC BLOOD PRESSURE: 84 MMHG | WEIGHT: 229 LBS | SYSTOLIC BLOOD PRESSURE: 130 MMHG

## 2025-02-17 DIAGNOSIS — Z95.828 PRESENCE OF IVC FILTER: ICD-10-CM

## 2025-02-17 DIAGNOSIS — G43.809 OTHER MIGRAINE WITHOUT STATUS MIGRAINOSUS, NOT INTRACTABLE: Primary | ICD-10-CM

## 2025-02-17 DIAGNOSIS — Z86.711 HISTORY OF PULMONARY EMBOLISM: ICD-10-CM

## 2025-02-17 DIAGNOSIS — Z00.00 ROUTINE GENERAL MEDICAL EXAMINATION AT A HEALTH CARE FACILITY: ICD-10-CM

## 2025-02-17 PROCEDURE — 99214 OFFICE O/P EST MOD 30 MIN: CPT | Performed by: FAMILY MEDICINE

## 2025-02-17 NOTE — PROGRESS NOTES
Follow Up Office Visit      Date of Visit:  2025   Patient Name: Emy Lira  : 1975   MRN: [id number]     Chief Complaint:    Chief Complaint   Patient presents with    FOLLOW UP ON DVT    Headache       History of Present Illness: Emy Lira is a 49 y.o. female who is here today for follow up.    History of Present Illness  The patient presents for a medication check.    She continues to take Xarelto, which was refilled in 2024 for a duration of one year. She has not experienced any additional pulmonary embolisms.    She also takes Ubrelvy as needed for her migraines, which she reports as being effective. She believes that some of her symptoms may be hormonal in nature.    She has been experiencing hip pain, which was somewhat alleviated by an injection. She has been advised to take gabapentin but has declined, expressing a desire to understand the cause of her hip pain. She has been unable to lie on her right side for several years, which she believes is contributing to her sleep disturbances. She has a known issue with her lower back, which was identified as a compression on MRI. However, a subsequent nerve test did not reveal any abnormalities.    She is up to date on her colon cancer screening, which she did last fall. She is scheduled for a mammogram in 2025 at [facility]. She is still having her monthly cycles.    MEDICATIONS  Xarelto, Ubrelvy      Subjective        Past Medical History:   Past Medical History:   Diagnosis Date    Arthritis 2013    Chronic pain disorder     Cluster headache     DVT (deep venous thrombosis)     Gallstones     Headache     Headache, tension-type     Kidney stones     Low back pain     Migraine     Obesity        Past Surgical History:   Past Surgical History:   Procedure Laterality Date    BARIATRIC SURGERY  2027    BREAST SURGERY      Biopsy. Unsure date     SECTION  2005    CHOLECYSTECTOMY  2003    EPIDURAL BLOCK   9/28/2002 and 6/7/2005    FOOT SURGERY Left 2020    GASTRIC SLEEVE LAPAROSCOPIC  11/13/2017    IVC FILTER RETRIEVAL  2017    FIlter is still in place    KNEE ARTHROSCOPY Left 12/2010    ORTHOPEDIC SURGERY      OTHER SURGICAL HISTORY  11/13/2017    IVC Filter (unable to remove) Attempted removal 4/2018    PAROTIDECTOMY Right 06/26/2017    TONSILLECTOMY  04/2014    TUBAL ABDOMINAL LIGATION  6/2005       Family History:   Family History   Problem Relation Age of Onset    Cancer Mother         Breast    Arthritis Mother     Breast cancer Mother     Hearing loss Mother     Kidney disease Father     Hypertension Father     Cancer Father         Kidney, prostrate    Arthritis Father     Heart disease Father     Kidney cancer Father     Breast cancer Paternal Aunt     Throat cancer Paternal Grandfather     Prostate cancer Paternal Grandfather     Cancer Paternal Grandfather         Throat       Social History:   Social History     Socioeconomic History    Marital status:    Tobacco Use    Smoking status: Never    Smokeless tobacco: Never   Vaping Use    Vaping status: Never Used   Substance and Sexual Activity    Alcohol use: No    Drug use: No    Sexual activity: Yes     Partners: Male     Birth control/protection: Surgical       Medications:     Current Outpatient Medications:     Calcium Carbonate-Vit D-Min (CALCIUM 1200 PO), Take 1 tablet by mouth 2 (Two) Times a Day., Disp: , Rfl:     Cholecalciferol (VITAMIN D) 2000 units capsule, Take 1 capsule by mouth Daily., Disp: , Rfl:     Multiple Vitamins-Minerals (MULTIVITAL PO), Take 1 tablet by mouth Daily., Disp: , Rfl:     Ubrelvy 100 MG tablet, TAKE 1 TABLET BY MOUTH ONE TIME AS NEEDED FOR HEADACHE FOR UP TO 1 DOSE, Disp: 10 tablet, Rfl: 10    Xarelto 20 MG tablet, Take 1 tablet by mouth Daily With Dinner., Disp: 90 tablet, Rfl: 3    Allergies:   Allergies   Allergen Reactions    Enoxaparin Unknown - Low Severity    Sulfonylureas Unknown - Low Severity     "Sulfa Antibiotics Rash       Objective     Physical Exam:  Vital Signs:   Vitals:    02/17/25 1329   BP: 130/84   Pulse: 84   SpO2: 98%   Weight: 104 kg (229 lb)   Height: 170.2 cm (67\")     Body mass index is 35.87 kg/m².     Physical Exam  Vitals and nursing note reviewed.   Constitutional:       General: She is not in acute distress.     Appearance: Normal appearance. She is not ill-appearing.   HENT:      Head: Normocephalic and atraumatic.      Right Ear: Tympanic membrane and ear canal normal.      Left Ear: Tympanic membrane and ear canal normal.      Nose: Nose normal.   Cardiovascular:      Rate and Rhythm: Normal rate and regular rhythm.      Heart sounds: Normal heart sounds.   Pulmonary:      Effort: Pulmonary effort is normal.      Breath sounds: Normal breath sounds.   Neurological:      Mental Status: She is alert and oriented to person, place, and time. Mental status is at baseline.   Psychiatric:         Mood and Affect: Mood normal.       Physical Exam      Procedures    Results    Assessment / Plan      Assessment/Plan:   Diagnoses and all orders for this visit:    1. Other migraine without status migrainosus, not intractable (Primary)    2. History of pulmonary embolism    3. Presence of IVC filter    4. Routine general medical examination at a health care facility  -     CBC & Differential  -     Comprehensive Metabolic Panel  -     Lipid Panel  -     TSH       Assessment & Plan  1. Medication check.  She is currently taking Xarelto, which was refilled in June 2024 and is good for four more months. She is also taking Ubrelvy, which was refilled in May 2024. Her blood pressure remains stable despite recent stressors. She will continue her current medications and will notify if refills are needed.    2. Health maintenance.  She is up to date on her colon cancer screening, which was done last fall. She is scheduled for a mammogram in March 2025 at [facility]. Routine blood work will be conducted " today to assess thyroid function, cholesterol levels, blood counts, kidney function, and liver function. She is advised to consider getting a flu shot next year and a tetanus shot.    3. Hip pain.  She reports persistent hip pain, which has not improved significantly with previous treatments. An MRI indicated compression, but nerve tests showed no significant issues. She prefers to avoid medication and surgery at this time.    4. Migraine.  She continues to take Ubrelvy for migraines, which she finds effective. She will need to send a message for a refill in May 2025.        Follow Up:   No follow-ups on file.    Anthony Reed  [facility] [place]     Patient or patient representative verbalized consent for the use of Ambient Listening during the visit with  Anthony Reed MD for chart documentation. 2/17/2025  13:46 EST

## 2025-02-18 LAB
ALBUMIN SERPL-MCNC: 3.9 G/DL (ref 3.9–4.9)
ALP SERPL-CCNC: 145 IU/L (ref 44–121)
ALT SERPL-CCNC: 12 IU/L (ref 0–32)
AST SERPL-CCNC: 14 IU/L (ref 0–40)
BASOPHILS # BLD AUTO: 0 X10E3/UL (ref 0–0.2)
BASOPHILS NFR BLD AUTO: 1 %
BILIRUB SERPL-MCNC: 0.3 MG/DL (ref 0–1.2)
BUN SERPL-MCNC: 13 MG/DL (ref 6–24)
BUN/CREAT SERPL: 15 (ref 9–23)
CALCIUM SERPL-MCNC: 9.1 MG/DL (ref 8.7–10.2)
CHLORIDE SERPL-SCNC: 108 MMOL/L (ref 96–106)
CHOLEST SERPL-MCNC: 182 MG/DL (ref 100–199)
CO2 SERPL-SCNC: 22 MMOL/L (ref 20–29)
CREAT SERPL-MCNC: 0.84 MG/DL (ref 0.57–1)
EGFRCR SERPLBLD CKD-EPI 2021: 86 ML/MIN/1.73
EOSINOPHIL # BLD AUTO: 0.1 X10E3/UL (ref 0–0.4)
EOSINOPHIL NFR BLD AUTO: 2 %
ERYTHROCYTE [DISTWIDTH] IN BLOOD BY AUTOMATED COUNT: 12.2 % (ref 11.7–15.4)
GLOBULIN SER CALC-MCNC: 2.3 G/DL (ref 1.5–4.5)
GLUCOSE SERPL-MCNC: 113 MG/DL (ref 70–99)
HCT VFR BLD AUTO: 41.1 % (ref 34–46.6)
HDLC SERPL-MCNC: 52 MG/DL
HGB BLD-MCNC: 13.6 G/DL (ref 11.1–15.9)
IMM GRANULOCYTES # BLD AUTO: 0 X10E3/UL (ref 0–0.1)
IMM GRANULOCYTES NFR BLD AUTO: 0 %
LDLC SERPL CALC-MCNC: 112 MG/DL (ref 0–99)
LYMPHOCYTES # BLD AUTO: 1.7 X10E3/UL (ref 0.7–3.1)
LYMPHOCYTES NFR BLD AUTO: 29 %
MCH RBC QN AUTO: 31.7 PG (ref 26.6–33)
MCHC RBC AUTO-ENTMCNC: 33.1 G/DL (ref 31.5–35.7)
MCV RBC AUTO: 96 FL (ref 79–97)
MONOCYTES # BLD AUTO: 0.5 X10E3/UL (ref 0.1–0.9)
MONOCYTES NFR BLD AUTO: 9 %
NEUTROPHILS # BLD AUTO: 3.6 X10E3/UL (ref 1.4–7)
NEUTROPHILS NFR BLD AUTO: 59 %
PLATELET # BLD AUTO: 311 X10E3/UL (ref 150–450)
POTASSIUM SERPL-SCNC: 4 MMOL/L (ref 3.5–5.2)
PROT SERPL-MCNC: 6.2 G/DL (ref 6–8.5)
RBC # BLD AUTO: 4.29 X10E6/UL (ref 3.77–5.28)
SODIUM SERPL-SCNC: 141 MMOL/L (ref 134–144)
TRIGL SERPL-MCNC: 99 MG/DL (ref 0–149)
TSH SERPL DL<=0.005 MIU/L-ACNC: 1.16 UIU/ML (ref 0.45–4.5)
VLDLC SERPL CALC-MCNC: 18 MG/DL (ref 5–40)
WBC # BLD AUTO: 6 X10E3/UL (ref 3.4–10.8)

## 2025-05-20 ENCOUNTER — OFFICE VISIT (OUTPATIENT)
Dept: FAMILY MEDICINE CLINIC | Facility: CLINIC | Age: 50
End: 2025-05-20
Payer: COMMERCIAL

## 2025-05-20 ENCOUNTER — TELEPHONE (OUTPATIENT)
Dept: FAMILY MEDICINE CLINIC | Facility: CLINIC | Age: 50
End: 2025-05-20

## 2025-05-20 VITALS
DIASTOLIC BLOOD PRESSURE: 72 MMHG | WEIGHT: 227 LBS | OXYGEN SATURATION: 98 % | SYSTOLIC BLOOD PRESSURE: 130 MMHG | BODY MASS INDEX: 35.63 KG/M2 | HEIGHT: 67 IN | HEART RATE: 62 BPM

## 2025-05-20 DIAGNOSIS — M54.2 CERVICALGIA: Primary | ICD-10-CM

## 2025-05-20 PROCEDURE — 99213 OFFICE O/P EST LOW 20 MIN: CPT | Performed by: PHYSICIAN ASSISTANT

## 2025-05-20 NOTE — TELEPHONE ENCOUNTER
Name: Emy Lira      Relationship: Self          HUB PROVIDED THE RELAY MESSAGE FROM THE OFFICE      PATIENT: VOICED UNDERSTANDING AND HAS NO FURTHER QUESTIONS AT THIS TIME

## 2025-05-20 NOTE — PROGRESS NOTES
"Chief Complaint  Neck Pain    Subjective          Emy Lira presents to [facility]  History of Present Illness  Patient in today for evaluation on neck pain for the past 3 weeks.  She denies any injury to the area.  She has full range of motion of the neck, states has always noticed some cracking and popping with movement and not a few finding.  She states normally if has an issue she will just get in with a chiropractor for an adjustment and it seems to help.  She states she has gone once a week for the last few weeks and has not really noticed much improvement.  She denies persistent numbness/tingling into arms.    Neck Pain   This is a new problem. The current episode started 1 to 4 weeks ago. The pain is associated with nothing. Associated symptoms include headaches. Pertinent negatives include no chest pain, fever, numbness, visual change or weakness.     Neck area pain.  Symptoms are: new.   Onset was 1 to 6 months.   Symptoms include: diaphoresis, fatigue, headaches and neck pain.   Pertinent negative symptoms include no abdominal pain, no anorexia, no joint pain, no change in stool, no chest pain, no chills, no congestion, no cough, no fever, no joint swelling, no myalgias, no nausea, no numbness, no rash, no sore throat, no swollen glands, no dysuria, no vertigo, no visual change, no vomiting and no weakness.   Treatment and/or Medications comments include: Chiropractic   Additional information: This is does not feel like muscular pain. It’s goes from up around C0/1 and down neck toward shoulder, right behind left ear.      Objective   Vital Signs:   /72   Pulse 62   Ht 170.2 cm (67\")   Wt 103 kg (227 lb)   SpO2 98%   BMI 35.55 kg/m²     Body mass index is 35.55 kg/m².    Review of Systems   Constitutional:  Positive for diaphoresis and fatigue. Negative for chills and fever.   HENT:  Negative for congestion, sore throat and swollen glands.    Respiratory:  " Negative for cough.    Cardiovascular:  Negative for chest pain.   Gastrointestinal:  Negative for abdominal pain, anorexia, nausea and vomiting.   Genitourinary:  Negative for dysuria.   Musculoskeletal:  Positive for neck pain. Negative for joint pain and myalgias.   Skin:  Negative for rash.   Neurological:  Negative for vertigo, weakness and numbness.       Past History:  Medical History: has a past medical history of Arthritis (), Chronic pain disorder (), Cluster headache, DVT (deep venous thrombosis), Gallstones, Headache, Headache, tension-type, Kidney stones, Low back pain, Migraine, Obesity, and Pulmonary embolism.   Surgical History: has a past surgical history that includes Cholecystectomy (2003);  section (2005); Knee arthroscopy (Left, 2010); Tonsillectomy (2014); Parotidectomy (Right, 2017); Gastric Sleeve (2017); Other surgical history (2017); Foot surgery (Left, ); IVC filter retrieval (); Epidural block injection (2002 and 2005); orthopedic surgery (); Bariatric Surgery (2027); Breast surgery; and Tubal ligation (2005).   Family History: family history includes Arthritis in her father and mother; Breast cancer in her mother and paternal aunt; Cancer in her father, mother, and paternal grandfather; Hearing loss in her mother; Heart disease in her father; Hyperlipidemia in her father; Hypertension in her father; Kidney cancer in her father; Kidney disease in her father; Prostate cancer in her paternal grandfather; Throat cancer in her paternal grandfather.   Social History: reports that she has never smoked. She has never used smokeless tobacco. She reports that she does not drink alcohol and does not use drugs.      Current Outpatient Medications:     Calcium Carbonate-Vit D-Min (CALCIUM 1200 PO), Take 1 tablet by mouth 2 (Two) Times a Day., Disp: , Rfl:     Ubrelvy 100 MG tablet, TAKE 1 TABLET BY MOUTH ONE TIME AS NEEDED  FOR HEADACHE FOR UP TO 1 DOSE, Disp: 10 tablet, Rfl: 10    Xarelto 20 MG tablet, Take 1 tablet by mouth Daily With Dinner., Disp: 90 tablet, Rfl: 3    Cholecalciferol (VITAMIN D) 2000 units capsule, Take 1 capsule by mouth Daily., Disp: , Rfl:     Multiple Vitamins-Minerals (MULTIVITAL PO), Take 1 tablet by mouth Daily., Disp: , Rfl:   Allergies: Enoxaparin, Sulfonylureas, and Sulfa antibiotics    Physical Exam  Constitutional:       Appearance: Normal appearance.   Musculoskeletal:      Comments: FROM of neck; mild tenderness to palpate upper neck and base of head area   Neurological:      Mental Status: She is alert and oriented to person, place, and time.   Psychiatric:         Mood and Affect: Mood normal.         Behavior: Behavior normal.             Assessment and Plan   Diagnoses and all orders for this visit:    1. Cervicalgia (Primary)  Will rtc for xray of neck for further evaluation- symptom management at this time; rtc or to ER for any acute worsening if needed           Follow Up   No follow-ups on file.  Patient was given instructions and counseling regarding her condition or for health maintenance advice. Please see specific information pulled into the AVS if appropriate.     Lennie Hernandez PA-C

## 2025-06-04 ENCOUNTER — RESULTS FOLLOW-UP (OUTPATIENT)
Dept: FAMILY MEDICINE CLINIC | Facility: CLINIC | Age: 50
End: 2025-06-04
Payer: COMMERCIAL

## 2025-06-04 NOTE — TELEPHONE ENCOUNTER
Attempted. No VM. My chart message also sent.       Hub to Relay    Please let know xray of neck did not show acute findings but did show chronic degenerative /arthritic changes with mild disc space narrowing at c3-c4 and c4-c5 with some spurring noted at c4-c5 level-- multilevel facet arthritis noted--please see if interested in getting in with orthopedist for further evaluation if having persistent pain/symptoms ; thanks

## 2025-06-16 DIAGNOSIS — G43.109 MIGRAINE WITH AURA AND WITHOUT STATUS MIGRAINOSUS, NOT INTRACTABLE: ICD-10-CM

## 2025-06-16 RX ORDER — UBROGEPANT 100 MG/1
TABLET ORAL
Qty: 10 TABLET | Refills: 9 | Status: SHIPPED | OUTPATIENT
Start: 2025-06-16

## 2025-06-17 RX ORDER — RIVAROXABAN 20 MG/1
20 TABLET, FILM COATED ORAL
Qty: 90 TABLET | Refills: 2 | Status: SHIPPED | OUTPATIENT
Start: 2025-06-17

## 2025-06-21 ENCOUNTER — OFFICE VISIT (OUTPATIENT)
Dept: FAMILY MEDICINE CLINIC | Facility: CLINIC | Age: 50
End: 2025-06-21
Payer: COMMERCIAL

## 2025-06-21 VITALS
OXYGEN SATURATION: 97 % | TEMPERATURE: 98.1 F | BODY MASS INDEX: 35.63 KG/M2 | HEART RATE: 88 BPM | SYSTOLIC BLOOD PRESSURE: 124 MMHG | DIASTOLIC BLOOD PRESSURE: 80 MMHG | HEIGHT: 67 IN | WEIGHT: 227 LBS

## 2025-06-21 DIAGNOSIS — J40 BRONCHITIS: Primary | ICD-10-CM

## 2025-06-21 PROCEDURE — 99213 OFFICE O/P EST LOW 20 MIN: CPT | Performed by: STUDENT IN AN ORGANIZED HEALTH CARE EDUCATION/TRAINING PROGRAM

## 2025-06-21 RX ORDER — DEXTROMETHORPHAN HYDROBROMIDE AND PROMETHAZINE HYDROCHLORIDE 15; 6.25 MG/5ML; MG/5ML
5 SYRUP ORAL 4 TIMES DAILY PRN
Qty: 200 ML | Refills: 0 | Status: SHIPPED | OUTPATIENT
Start: 2025-06-21

## 2025-06-21 RX ORDER — PREDNISONE 10 MG/1
40 TABLET ORAL DAILY
Qty: 20 TABLET | Refills: 0 | Status: SHIPPED | OUTPATIENT
Start: 2025-06-21 | End: 2025-06-26

## 2025-06-21 RX ORDER — AZITHROMYCIN 250 MG/1
TABLET, FILM COATED ORAL
Qty: 6 TABLET | Refills: 0 | Status: SHIPPED | OUTPATIENT
Start: 2025-06-21

## 2025-06-21 NOTE — PROGRESS NOTES
"Chief Complaint  Nasal Congestion (Congestion, cough since Wednesday )    Subjective          Emy Lira presents to [facility]  Cough  Chronicity:  New  Onset:  In the past 7 days  Progression since onset:  Worsening  Frequency:  Constantly  Cough characteristics:  Productive of sputum  Associated symptoms: headaches, nasal congestion, postnasal drip and sore throat    Associated symptoms: no ear pain    Aggravated by:  Animals, dust and pollens  Risk factors for lung disease:  Animal exposure and new environmental exposures  Treatments tried:  OTC cough suppressant  Improvement on treatment:  Mild      Objective   Vital Signs:   /80   Pulse 88   Temp 98.1 °F (36.7 °C)   Ht 170.2 cm (67\")   Wt 103 kg (227 lb)   SpO2 97%   BMI 35.55 kg/m²     Body mass index is 35.55 kg/m².    Review of Systems   HENT:  Positive for postnasal drip and sore throat. Negative for ear pain.    Respiratory:  Positive for cough.        Past History:  Medical History: has a past medical history of Arthritis (), Chronic pain disorder (), Cluster headache, DVT (deep venous thrombosis), Gallstones, Headache, Headache, tension-type, Kidney stones, Low back pain, Migraine, Obesity, and Pulmonary embolism.   Surgical History: has a past surgical history that includes Cholecystectomy (2003);  section (2005); Knee arthroscopy (Left, 2010); Tonsillectomy (2014); Parotidectomy (Right, 2017); Gastric Sleeve (2017); Other surgical history (2017); Foot surgery (Left, ); IVC filter retrieval (); Epidural block injection (2002 and 2005); orthopedic surgery (); Bariatric Surgery (2027); Breast surgery; and Tubal ligation (2005).   Family History: family history includes Arthritis in her father and mother; Breast cancer in her mother and paternal aunt; Cancer in her father, mother, and paternal grandfather; Hearing loss in her " mother; Heart disease in her father; Hyperlipidemia in her father; Hypertension in her father; Kidney cancer in her father; Kidney disease in her father; Prostate cancer in her paternal grandfather; Throat cancer in her paternal grandfather.   Social History: reports that she has never smoked. She has never used smokeless tobacco. She reports that she does not drink alcohol and does not use drugs.      Current Outpatient Medications:     Calcium Carbonate-Vit D-Min (CALCIUM 1200 PO), Take 1 tablet by mouth 2 (Two) Times a Day., Disp: , Rfl:     Cholecalciferol (VITAMIN D) 2000 units capsule, Take 1 capsule by mouth Daily., Disp: , Rfl:     Multiple Vitamins-Minerals (MULTIVITAL PO), Take 1 tablet by mouth Daily., Disp: , Rfl:     Ubrelvy 100 MG tablet, TAKE 1 TABLET BY MOUTH ONE TIME AS NEEDED FOR HEADACHE FOR UP TO 1 DOSE DAILY, Disp: 10 tablet, Rfl: 9    Xarelto 20 MG tablet, TAKE 1 TABLET BY MOUTH ONCE DAILY WITH DINNER, Disp: 90 tablet, Rfl: 2    azithromycin (ZITHROMAX) 250 MG tablet, Take 2 tablets on day 1 then 1 tablet until complete, Disp: 6 tablet, Rfl: 0    predniSONE (DELTASONE) 10 MG tablet, Take 4 tablets by mouth Daily for 5 days., Disp: 20 tablet, Rfl: 0    promethazine-dextromethorphan (PROMETHAZINE-DM) 6.25-15 MG/5ML syrup, Take 5 mL by mouth 4 (Four) Times a Day As Needed for Cough., Disp: 200 mL, Rfl: 0    Allergies: Enoxaparin, Sulfonylureas, and Sulfa antibiotics    Physical Exam  Constitutional:       General: She is not in acute distress.     Appearance: She is not ill-appearing or toxic-appearing.   HENT:      Head: Normocephalic and atraumatic.   Cardiovascular:      Rate and Rhythm: Normal rate and regular rhythm.      Heart sounds: No murmur heard.  Pulmonary:      Effort: Pulmonary effort is normal. No respiratory distress.      Breath sounds: Wheezing present. No rhonchi.   Neurological:      General: No focal deficit present.      Mental Status: She is alert and oriented to person,  place, and time.   Psychiatric:         Mood and Affect: Mood normal.         Thought Content: Thought content normal.          Result Review :                   Assessment and Plan    Diagnoses and all orders for this visit:    1. Bronchitis (Primary)    Other orders  -     azithromycin (ZITHROMAX) 250 MG tablet; Take 2 tablets on day 1 then 1 tablet until complete  Dispense: 6 tablet; Refill: 0  -     predniSONE (DELTASONE) 10 MG tablet; Take 4 tablets by mouth Daily for 5 days.  Dispense: 20 tablet; Refill: 0  -     promethazine-dextromethorphan (PROMETHAZINE-DM) 6.25-15 MG/5ML syrup; Take 5 mL by mouth 4 (Four) Times a Day As Needed for Cough.  Dispense: 200 mL; Refill: 0    Will treat with prednisone and azithromycin for 5 days.  Bromfed for cough. Tylenol/Motrin for pain/fever. OTC cough and cold medication for symptoms. Nasal saline spray recommended. Cool mist humidifier at the bedside. RTC with new or worsening symptoms.      Follow Up   No follow-ups on file.  Patient was given instructions and counseling regarding her condition or for health maintenance advice. Please see specific information pulled into the AVS if appropriate.     Anne Tay, DO